# Patient Record
Sex: MALE
[De-identification: names, ages, dates, MRNs, and addresses within clinical notes are randomized per-mention and may not be internally consistent; named-entity substitution may affect disease eponyms.]

---

## 2018-03-02 ENCOUNTER — CHARTING TRANS (OUTPATIENT)
Dept: OTHER | Age: 23
End: 2018-03-02

## 2018-03-02 ENCOUNTER — IMAGING SERVICES (OUTPATIENT)
Dept: OTHER | Age: 23
End: 2018-03-02

## 2018-11-01 VITALS
HEART RATE: 101 BPM | TEMPERATURE: 98.4 F | OXYGEN SATURATION: 97 % | SYSTOLIC BLOOD PRESSURE: 110 MMHG | RESPIRATION RATE: 15 BRPM | DIASTOLIC BLOOD PRESSURE: 68 MMHG

## 2020-08-21 LAB — AMB EXT COVID-19 RESULT: NOT DETECTED

## 2020-08-28 ENCOUNTER — HOSPITAL ENCOUNTER (EMERGENCY)
Facility: HOSPITAL | Age: 25
Discharge: HOME | End: 2020-08-28
Attending: EMERGENCY MEDICINE
Payer: OTHER MISCELLANEOUS

## 2020-08-28 VITALS
OXYGEN SATURATION: 95 % | SYSTOLIC BLOOD PRESSURE: 132 MMHG | HEIGHT: 68 IN | TEMPERATURE: 98 F | RESPIRATION RATE: 22 BRPM | DIASTOLIC BLOOD PRESSURE: 79 MMHG | BODY MASS INDEX: 43.19 KG/M2 | WEIGHT: 285 LBS | HEART RATE: 82 BPM

## 2020-08-28 DIAGNOSIS — M54.6 BACK PAIN OF THORACOLUMBAR REGION: Primary | ICD-10-CM

## 2020-08-28 DIAGNOSIS — M54.50 BACK PAIN OF THORACOLUMBAR REGION: Primary | ICD-10-CM

## 2020-08-28 PROCEDURE — 99284 EMERGENCY DEPT VISIT MOD MDM: CPT

## 2020-08-28 PROCEDURE — 96375 TX/PRO/DX INJ NEW DRUG ADDON: CPT

## 2020-08-28 PROCEDURE — 96374 THER/PROPH/DIAG INJ IV PUSH: CPT

## 2020-08-28 RX ORDER — METHYLPREDNISOLONE 4 MG/1
TABLET ORAL
Qty: 1 PACKAGE | Refills: 0 | Status: SHIPPED | OUTPATIENT
Start: 2020-08-28

## 2020-08-28 RX ORDER — DEXAMETHASONE SODIUM PHOSPHATE 4 MG/ML
10 VIAL (ML) INJECTION ONCE
Status: COMPLETED | OUTPATIENT
Start: 2020-08-28 | End: 2020-08-28

## 2020-08-28 RX ORDER — DIAZEPAM 5 MG/ML
5 INJECTION, SOLUTION INTRAMUSCULAR; INTRAVENOUS ONCE
Status: COMPLETED | OUTPATIENT
Start: 2020-08-28 | End: 2020-08-28

## 2020-08-28 RX ORDER — MELOXICAM 15 MG/1
15 TABLET ORAL DAILY
COMMUNITY

## 2020-08-28 RX ORDER — HYDROMORPHONE HYDROCHLORIDE 1 MG/ML
0.5 INJECTION, SOLUTION INTRAMUSCULAR; INTRAVENOUS; SUBCUTANEOUS EVERY 30 MIN PRN
Status: DISCONTINUED | OUTPATIENT
Start: 2020-08-28 | End: 2020-08-28

## 2020-08-28 RX ORDER — NABUMETONE 500 MG/1
500 TABLET, FILM COATED ORAL 2 TIMES DAILY
COMMUNITY

## 2020-08-28 RX ORDER — CYCLOBENZAPRINE HCL 10 MG
10 TABLET ORAL 3 TIMES DAILY PRN
Qty: 20 TABLET | Refills: 0 | Status: SHIPPED | OUTPATIENT
Start: 2020-08-28 | End: 2020-09-04

## 2020-08-28 RX ORDER — KETOROLAC TROMETHAMINE 30 MG/ML
30 INJECTION, SOLUTION INTRAMUSCULAR; INTRAVENOUS ONCE
Status: COMPLETED | OUTPATIENT
Start: 2020-08-28 | End: 2020-08-28

## 2020-08-28 RX ORDER — HYDROCODONE BITARTRATE AND ACETAMINOPHEN 5; 325 MG/1; MG/1
1-2 TABLET ORAL EVERY 6 HOURS PRN
Qty: 10 TABLET | Refills: 0 | Status: SHIPPED | OUTPATIENT
Start: 2020-08-28 | End: 2020-09-04

## 2020-08-28 NOTE — ED INITIAL ASSESSMENT (HPI)
Herniated disk in march after work injury. Right lower Back pain today while getting up from sitting on toilet. 100mcg fentanyl given in ambulance.

## 2020-08-28 NOTE — ED PROVIDER NOTES
Patient Seen in: [facility] Emergency Department      History   Patient presents with:  Back Pain    Stated Complaint: back pain    HPI    49-year-old male with known history of herniated disc from L3-S1 related to a work injury in March of this yea auscultation bilaterally. Heart: Regular rate and rhythm. Abdomen: Soft, nontender. No CVA tenderness. No mass. Skin: No rash. No edema. Neurologic: No focal neurologic deficits.   Normal speech pattern  Musculoskeletal: Right SI tenderness to palpat spasms. Qty: 20 tablet Refills: 0    HYDROcodone-acetaminophen 5-325 MG Oral Tab  Take 1-2 tablets by mouth every 6 (six) hours as needed for Pain.   Qty: 10 tablet Refills: 0

## 2020-09-10 ENCOUNTER — HOSPITAL ENCOUNTER (EMERGENCY)
Facility: HOSPITAL | Age: 25
Discharge: HOME | End: 2020-09-10
Attending: EMERGENCY MEDICINE
Payer: OTHER MISCELLANEOUS

## 2020-09-10 ENCOUNTER — APPOINTMENT (OUTPATIENT)
Dept: MRI IMAGING | Facility: HOSPITAL | Age: 25
End: 2020-09-10
Attending: EMERGENCY MEDICINE
Payer: OTHER MISCELLANEOUS

## 2020-09-10 ENCOUNTER — APPOINTMENT (OUTPATIENT)
Dept: GENERAL RADIOLOGY | Facility: HOSPITAL | Age: 25
End: 2020-09-10
Attending: EMERGENCY MEDICINE
Payer: OTHER MISCELLANEOUS

## 2020-09-10 VITALS
WEIGHT: 285.06 LBS | OXYGEN SATURATION: 98 % | HEART RATE: 84 BPM | TEMPERATURE: 98 F | SYSTOLIC BLOOD PRESSURE: 137 MMHG | HEIGHT: 68 IN | RESPIRATION RATE: 18 BRPM | DIASTOLIC BLOOD PRESSURE: 77 MMHG | BODY MASS INDEX: 43.2 KG/M2

## 2020-09-10 DIAGNOSIS — M54.50 BACK PAIN, LUMBOSACRAL: Primary | ICD-10-CM

## 2020-09-10 PROCEDURE — 96375 TX/PRO/DX INJ NEW DRUG ADDON: CPT

## 2020-09-10 PROCEDURE — 99284 EMERGENCY DEPT VISIT MOD MDM: CPT

## 2020-09-10 PROCEDURE — 72110 X-RAY EXAM L-2 SPINE 4/>VWS: CPT | Performed by: EMERGENCY MEDICINE

## 2020-09-10 PROCEDURE — 96374 THER/PROPH/DIAG INJ IV PUSH: CPT

## 2020-09-10 PROCEDURE — 96376 TX/PRO/DX INJ SAME DRUG ADON: CPT

## 2020-09-10 PROCEDURE — 72148 MRI LUMBAR SPINE W/O DYE: CPT | Performed by: EMERGENCY MEDICINE

## 2020-09-10 RX ORDER — HYDROCODONE BITARTRATE AND ACETAMINOPHEN 5; 325 MG/1; MG/1
1-2 TABLET ORAL EVERY 6 HOURS PRN
Qty: 10 TABLET | Refills: 0 | Status: SHIPPED | OUTPATIENT
Start: 2020-09-10 | End: 2020-09-17

## 2020-09-10 RX ORDER — METHYLPREDNISOLONE SODIUM SUCCINATE 125 MG/2ML
125 INJECTION, POWDER, LYOPHILIZED, FOR SOLUTION INTRAMUSCULAR; INTRAVENOUS ONCE
Status: COMPLETED | OUTPATIENT
Start: 2020-09-10 | End: 2020-09-10

## 2020-09-10 RX ORDER — METHYLPREDNISOLONE 4 MG/1
TABLET ORAL
Qty: 1 PACKAGE | Refills: 0 | Status: SHIPPED | OUTPATIENT
Start: 2020-09-10

## 2020-09-10 RX ORDER — HYDROMORPHONE HYDROCHLORIDE 1 MG/ML
0.5 INJECTION, SOLUTION INTRAMUSCULAR; INTRAVENOUS; SUBCUTANEOUS ONCE
Status: COMPLETED | OUTPATIENT
Start: 2020-09-10 | End: 2020-09-10

## 2020-09-10 RX ORDER — KETOROLAC TROMETHAMINE 30 MG/ML
30 INJECTION, SOLUTION INTRAMUSCULAR; INTRAVENOUS ONCE
Status: COMPLETED | OUTPATIENT
Start: 2020-09-10 | End: 2020-09-10

## 2020-09-10 NOTE — ED INITIAL ASSESSMENT (HPI)
Pt states was seen here 2 weeks ago for same severe lumbar back pain,saw chiropractor and states got worse after that

## 2020-09-11 NOTE — ED PROVIDER NOTES
Patient Seen in: [facility] Emergency Department      History   Patient presents with:  Back Pain    Stated Complaint: back pain    HPI    Patient is a 28-year-old male presents emergency room with a history of ongoing low back pain is been present 1846 98 %   O2 Device 09/10/20 1945 None (Room air)       Current:/77   Pulse 84   Temp 97.8 °F (36.6 °C)   Resp 18   Ht 172.7 cm (5' 8\")   Wt 129.3 kg   SpO2 98%   BMI 43.34 kg/m²         Physical Exam  GENERAL: Well-developed, well-nourished femal TECHNIQUE:  AP, lateral, oblique, and coned down L5-S1 views were obtained. COMPARISON:  None. INDICATIONS:  back pain  PATIENT STATED HISTORY: (As transcribed by Technologist)  Patient is having low back pain; worse on right but has pain bilaterally.  Pa no evidence of a neurologic compromise in either lower extremity and no history of any bowel or bladder incontinence or retention. Will discharge home at this time. Patient x-ray and MRI findings as noted above.   The patient was given IV Toradol, IV So MG Oral Tab  Take 1-2 tablets by mouth every 6 (six) hours as needed for Pain. Qty: 10 tablet Refills: 0    !! - Potential duplicate medications found. Please discuss with provider.

## 2024-01-11 ENCOUNTER — HOSPITAL ENCOUNTER (OUTPATIENT)
Facility: HOSPITAL | Age: 29
Setting detail: OBSERVATION
Discharge: HOME | End: 2024-01-12
Attending: EMERGENCY MEDICINE | Admitting: HOSPITALIST
Payer: COMMERCIAL

## 2024-01-11 ENCOUNTER — APPOINTMENT (OUTPATIENT)
Dept: CT IMAGING | Facility: HOSPITAL | Age: 29
End: 2024-01-11
Attending: EMERGENCY MEDICINE
Payer: COMMERCIAL

## 2024-01-11 ENCOUNTER — APPOINTMENT (OUTPATIENT)
Dept: GENERAL RADIOLOGY | Facility: HOSPITAL | Age: 29
End: 2024-01-11
Payer: COMMERCIAL

## 2024-01-11 ENCOUNTER — APPOINTMENT (OUTPATIENT)
Dept: CV DIAGNOSTICS | Facility: HOSPITAL | Age: 29
End: 2024-01-11
Attending: INTERNAL MEDICINE
Payer: COMMERCIAL

## 2024-01-11 DIAGNOSIS — R07.9 ACUTE CHEST PAIN: Primary | ICD-10-CM

## 2024-01-11 LAB
ALBUMIN SERPL-MCNC: 4.4 G/DL (ref 3.4–5)
ALBUMIN/GLOB SERPL: 1 {RATIO} (ref 1–2)
ALP LIVER SERPL-CCNC: 136 U/L
ALT SERPL-CCNC: 101 U/L
ANION GAP SERPL CALC-SCNC: 3 MMOL/L (ref 0–18)
AST SERPL-CCNC: 43 U/L (ref 15–37)
ATRIAL RATE: 66 BPM
ATRIAL RATE: 69 BPM
BASOPHILS # BLD AUTO: 0.06 X10(3) UL (ref 0–0.2)
BASOPHILS NFR BLD AUTO: 0.6 %
BILIRUB SERPL-MCNC: 0.4 MG/DL (ref 0.1–2)
BUN BLD-MCNC: 15 MG/DL (ref 9–23)
CALCIUM BLD-MCNC: 9 MG/DL (ref 8.5–10.1)
CHLORIDE SERPL-SCNC: 106 MMOL/L (ref 98–112)
CHOLEST SERPL-MCNC: 156 MG/DL (ref ?–200)
CO2 SERPL-SCNC: 29 MMOL/L (ref 21–32)
CREAT BLD-MCNC: 0.71 MG/DL
D DIMER PPP FEU-MCNC: <0.27 UG/ML FEU (ref ?–0.5)
EGFRCR SERPLBLD CKD-EPI 2021: 127 ML/MIN/1.73M2 (ref 60–?)
EOSINOPHIL # BLD AUTO: 0.21 X10(3) UL (ref 0–0.7)
EOSINOPHIL NFR BLD AUTO: 2 %
ERYTHROCYTE [DISTWIDTH] IN BLOOD BY AUTOMATED COUNT: 13.2 %
GLOBULIN PLAS-MCNC: 4.2 G/DL (ref 2.8–4.4)
GLUCOSE BLD-MCNC: 107 MG/DL (ref 70–99)
HCT VFR BLD AUTO: 45.7 %
HDLC SERPL-MCNC: 37 MG/DL (ref 40–59)
HGB BLD-MCNC: 15.8 G/DL
IMM GRANULOCYTES # BLD AUTO: 0.05 X10(3) UL (ref 0–1)
IMM GRANULOCYTES NFR BLD: 0.5 %
LDLC SERPL CALC-MCNC: 91 MG/DL (ref ?–100)
LYMPHOCYTES # BLD AUTO: 4.31 X10(3) UL (ref 1–4)
LYMPHOCYTES NFR BLD AUTO: 41 %
MCH RBC QN AUTO: 29.9 PG (ref 26–34)
MCHC RBC AUTO-ENTMCNC: 34.6 G/DL (ref 31–37)
MCV RBC AUTO: 86.4 FL
MONOCYTES # BLD AUTO: 0.64 X10(3) UL (ref 0.1–1)
MONOCYTES NFR BLD AUTO: 6.1 %
NEUTROPHILS # BLD AUTO: 5.23 X10 (3) UL (ref 1.5–7.7)
NEUTROPHILS # BLD AUTO: 5.23 X10(3) UL (ref 1.5–7.7)
NEUTROPHILS NFR BLD AUTO: 49.8 %
NONHDLC SERPL-MCNC: 119 MG/DL (ref ?–130)
OSMOLALITY SERPL CALC.SUM OF ELEC: 287 MOSM/KG (ref 275–295)
P AXIS: 2 DEGREES
P AXIS: 3 DEGREES
P-R INTERVAL: 164 MS
P-R INTERVAL: 172 MS
PLATELET # BLD AUTO: 305 10(3)UL (ref 150–450)
POTASSIUM SERPL-SCNC: 3.7 MMOL/L (ref 3.5–5.1)
PROT SERPL-MCNC: 8.6 G/DL (ref 6.4–8.2)
Q-T INTERVAL: 404 MS
Q-T INTERVAL: 418 MS
QRS DURATION: 94 MS
QRS DURATION: 96 MS
QTC CALCULATION (BEZET): 432 MS
QTC CALCULATION (BEZET): 438 MS
R AXIS: 0 DEGREES
R AXIS: 7 DEGREES
RBC # BLD AUTO: 5.29 X10(6)UL
SODIUM SERPL-SCNC: 138 MMOL/L (ref 136–145)
T AXIS: 16 DEGREES
T AXIS: 24 DEGREES
TRIGL SERPL-MCNC: 159 MG/DL (ref 30–149)
TROPONIN I SERPL HS-MCNC: 195 NG/L
TROPONIN I SERPL HS-MCNC: 212 NG/L
VENTRICULAR RATE: 66 BPM
VENTRICULAR RATE: 69 BPM
VLDLC SERPL CALC-MCNC: 26 MG/DL (ref 0–30)
WBC # BLD AUTO: 10.5 X10(3) UL (ref 4–11)

## 2024-01-11 PROCEDURE — 80061 LIPID PANEL: CPT | Performed by: EMERGENCY MEDICINE

## 2024-01-11 PROCEDURE — 93005 ELECTROCARDIOGRAM TRACING: CPT

## 2024-01-11 PROCEDURE — 99285 EMERGENCY DEPT VISIT HI MDM: CPT

## 2024-01-11 PROCEDURE — 71275 CT ANGIOGRAPHY CHEST: CPT | Performed by: EMERGENCY MEDICINE

## 2024-01-11 PROCEDURE — 85025 COMPLETE CBC W/AUTO DIFF WBC: CPT | Performed by: EMERGENCY MEDICINE

## 2024-01-11 PROCEDURE — 93306 TTE W/DOPPLER COMPLETE: CPT | Performed by: INTERNAL MEDICINE

## 2024-01-11 PROCEDURE — 96374 THER/PROPH/DIAG INJ IV PUSH: CPT

## 2024-01-11 PROCEDURE — 80053 COMPREHEN METABOLIC PANEL: CPT | Performed by: EMERGENCY MEDICINE

## 2024-01-11 PROCEDURE — 84484 ASSAY OF TROPONIN QUANT: CPT | Performed by: EMERGENCY MEDICINE

## 2024-01-11 PROCEDURE — 96372 THER/PROPH/DIAG INJ SC/IM: CPT

## 2024-01-11 PROCEDURE — 85379 FIBRIN DEGRADATION QUANT: CPT | Performed by: EMERGENCY MEDICINE

## 2024-01-11 PROCEDURE — 71045 X-RAY EXAM CHEST 1 VIEW: CPT

## 2024-01-11 PROCEDURE — 80053 COMPREHEN METABOLIC PANEL: CPT

## 2024-01-11 PROCEDURE — 85025 COMPLETE CBC W/AUTO DIFF WBC: CPT

## 2024-01-11 PROCEDURE — 93010 ELECTROCARDIOGRAM REPORT: CPT

## 2024-01-11 PROCEDURE — 70450 CT HEAD/BRAIN W/O DYE: CPT | Performed by: EMERGENCY MEDICINE

## 2024-01-11 PROCEDURE — 84484 ASSAY OF TROPONIN QUANT: CPT

## 2024-01-11 RX ORDER — PROCHLORPERAZINE EDISYLATE 5 MG/ML
5 INJECTION INTRAMUSCULAR; INTRAVENOUS EVERY 8 HOURS PRN
Status: DISCONTINUED | OUTPATIENT
Start: 2024-01-11 | End: 2024-01-12

## 2024-01-11 RX ORDER — ONDANSETRON 2 MG/ML
4 INJECTION INTRAMUSCULAR; INTRAVENOUS EVERY 6 HOURS PRN
Status: DISCONTINUED | OUTPATIENT
Start: 2024-01-11 | End: 2024-01-12

## 2024-01-11 RX ORDER — MIRTAZAPINE 15 MG/1
15 TABLET, ORALLY DISINTEGRATING ORAL NIGHTLY PRN
Status: DISCONTINUED | OUTPATIENT
Start: 2024-01-11 | End: 2024-01-12

## 2024-01-11 RX ORDER — GABAPENTIN 300 MG/1
300 CAPSULE ORAL 2 TIMES DAILY
Status: DISCONTINUED | OUTPATIENT
Start: 2024-01-11 | End: 2024-01-12

## 2024-01-11 RX ORDER — GABAPENTIN 300 MG/1
300 CAPSULE ORAL 2 TIMES DAILY
COMMUNITY
Start: 2023-10-25

## 2024-01-11 RX ORDER — ACETAMINOPHEN 500 MG
500 TABLET ORAL EVERY 4 HOURS PRN
Status: DISCONTINUED | OUTPATIENT
Start: 2024-01-11 | End: 2024-01-12

## 2024-01-11 RX ORDER — MIRTAZAPINE 15 MG/1
15 TABLET, FILM COATED ORAL
COMMUNITY

## 2024-01-11 RX ORDER — LISINOPRIL 30 MG/1
30 TABLET ORAL DAILY
Status: ON HOLD | COMMUNITY
Start: 2023-06-30 | End: 2024-01-11

## 2024-01-11 RX ORDER — METOPROLOL TARTRATE 50 MG/1
50 TABLET, FILM COATED ORAL
Status: DISCONTINUED | OUTPATIENT
Start: 2024-01-11 | End: 2024-01-12

## 2024-01-11 RX ORDER — MORPHINE SULFATE 4 MG/ML
4 INJECTION, SOLUTION INTRAMUSCULAR; INTRAVENOUS ONCE
Status: COMPLETED | OUTPATIENT
Start: 2024-01-11 | End: 2024-01-11

## 2024-01-11 RX ORDER — HEPARIN SODIUM 5000 [USP'U]/ML
7500 INJECTION, SOLUTION INTRAVENOUS; SUBCUTANEOUS EVERY 8 HOURS SCHEDULED
Status: DISCONTINUED | OUTPATIENT
Start: 2024-01-11 | End: 2024-01-12

## 2024-01-11 RX ORDER — ASPIRIN 81 MG/1
324 TABLET, CHEWABLE ORAL ONCE
Status: COMPLETED | OUTPATIENT
Start: 2024-01-11 | End: 2024-01-11

## 2024-01-11 RX ORDER — DIVALPROEX SODIUM 500 MG/1
500 TABLET, EXTENDED RELEASE ORAL DAILY
Status: DISCONTINUED | OUTPATIENT
Start: 2024-01-11 | End: 2024-01-12

## 2024-01-11 RX ORDER — DIVALPROEX SODIUM 500 MG/1
1 TABLET, EXTENDED RELEASE ORAL DAILY
COMMUNITY
Start: 2022-11-05

## 2024-01-11 NOTE — ED QUICK NOTES
Orders for admission, patient is aware of plan and ready to go upstairs. Any questions, please call ED RN Zulay at extension [phone].     Patient Covid vaccination status: Fully vaccinated     COVID Test Ordered in ED: None    COVID Suspicion at Admission: N/A    Running Infusions:  None    Mental Status/LOC at time of transport: Alert, oriented X4.    Other pertinent information: Radiologist said to follow the protocol to \"keep HR low\" for CT Coronary tomorrow.  CIWA score: N/A   NIH score:  N/A

## 2024-01-11 NOTE — ED QUICK NOTES
Patient states he was at work, jumped to grab a cord and when he came down he lost his balance. A coworker helped balance him. He did not fall. He states he \"briefly lost feeling in his legs.\" Was able to go back to work, but has felt chest wall pain from \"ciwbch-pj-cceayg like a burning, stabbing sensation\" that is painful with palpation. Patient denies any shortness of breath. No pain with deep breathing. Patient states he \"feels like it's something spine related\" because he has a history of back problems.

## 2024-01-11 NOTE — H&P
[facility]    History and Physical     Peewee Myers Patient Status:  Emergency    10/22/1995 MRN [id number]   Location [facility] EMERGENCY DEPARTMENT Attending Yumiko Servin DO   Hosp Day # 0 PCP Wilver Martinez MD     Chief Complaint: Chest pain    History of Present Illness: Peewee Myers is a 28 year old male with history of anxiety, depression, hypertension, obesity presenting with chest pain.  Patient says he started having chest pain this afternoon.  Localized to the mid chest.  No associated symptoms.  Patient does mention recent trauma to the chest with equipment following on him 2 days prior.  Patient denies any pain consistent with the pain he is having now at that time.  Patient does also mention having a headache for the last 2 weeks.  He also has associated nausea with this.  Patient mentions some intermittent blurry vision.  Patient currently presented to emergency room due to continued chest discomfort.  Patient does mention having cocaine earlier in the day.  Patient also did cocaine yesterday.  Patient never had symptoms of chest pain after episodes of cocaine use.    Past Medical History:  Past Medical History:   Diagnosis Date    Anxiety     Back pain     Cervical spondylosis     Depression     Essential hypertension     Lactose intolerance     Obesity     Suicidal thoughts        Past Surgical History: History reviewed. No pertinent surgical history.    Social History:  reports that he has quit smoking. He has never used smokeless tobacco. He reports that he does not currently use alcohol. He reports that he does not use drugs.1 ppd tobacco, no alcohol use 5 days- 4/5 beers a day normally, used cocaine this am, marijuana, , 3 children, working, no cane or walker     Family History: No family history on file.  Mother living  Father living  2 sisters living  0 brothers    Allergies:     Allergies   Allergen Reactions    Lactose OTHER (SEE COMMENTS)     gas        Medications:    No current facility-administered medications on file prior to encounter.     Current Outpatient Medications on File Prior to Encounter   Medication Sig Dispense Refill    Brexpiprazole 1 MG Oral Tab Take by mouth daily.      divalproex  MG Oral Tablet 24 Hr Take 1 tablet (500 mg total) by mouth Q12H.      gabapentin 300 MG Oral Cap       lisinopril 30 MG Oral Tab Take 1 tablet (30 mg total) by mouth daily.      methylPREDNISolone (MEDROL) 4 MG Oral Tablet Therapy Pack Dosepack: take as directed 1 Package 0    Meloxicam 15 MG Oral Tab Take 15 mg by mouth daily.      Nabumetone 500 MG Oral Tab Take 500 mg by mouth 2 (two) times daily.      methylPREDNISolone (MEDROL) 4 MG Oral Tablet Therapy Pack Dosepack: take as directed 1 Package 0       Review of Systems:   A comprehensive 14 point review of systems was completed.    Pertinent positives and negatives noted in the HPI.    Physical Exam:    BP (!) 145/99   Pulse 63   Temp 96.9 °F (36.1 °C) (Temporal)   Resp 21   Ht 5' 7\" (1.702 m)   Wt (!) 315 lb (142.9 kg)   SpO2 96%   BMI 49.34 kg/m²   General: No acute distress. Alert and oriented x 3.  HEENT: Normocephalic atraumatic. Moist mucous membranes. EOM-I.  Neck: No JVD. No carotid bruits.  Respiratory: Clear to auscultation bilaterally. No wheezes. No crackles  Cardiovascular: S1, S2. Regular rate and rhythm. No murmurs  Chest and Back: No tenderness or deformity.  Abdomen: Soft, nontender, nondistended.  Positive bowel sounds. No rebound, guarding  Neurologic: No focal neurological deficits. CNII-XII grossly intact. Sensation and strength intact  Musculoskeletal: Moves all extremities.  Extremities: No edema or tenderness of the LE  Integument: No new rashes or lesions.   Psychiatric: Appropriate mood and affect.      Diagnostic Data:      Labs:  Recent Labs   Lab 01/11/24  1303   WBC 10.5   HGB 15.8   MCV 86.4   .0       Recent Labs   Lab 01/11/24  1303   *   BUN 15    CREATSERUM 0.71   CA 9.0   ALB 4.4      K 3.7      CO2 29.0   ALKPHO 136*   AST 43*   *   BILT 0.4   TP 8.6*       Estimated Creatinine Clearance: 142.2 mL/min (based on SCr of 0.71 mg/dL).    No results for input(s): \"PTP\", \"INR\" in the last 168 hours.    No results for input(s): \"TROP\", \"CK\" in the last 168 hours.    Imaging: Imaging data reviewed in Epic.      ASSESSMENT / PLAN:   28 year old male with history of anxiety, depression, hypertension, obesity presenting with chest pain.    Chest Pain  -etiology uncertain  -pt with cocaine use, possible cause of sx  -trop elevated  -cardiology consulted  -CTA chest pending  -echo pending  -coronary CTA tomorrow pending above evaluation  -monitor on tele    Headache  Blurry Vision  -etiology uncertain  -possibly secondary to drug use  -echo pending to evaluate for possible vegetations  -ct brain neg for acute pathology   -monitor clinically, if recurrent or worsening sx consider Neurology inpatient evaluation    Elevated LFT  -likely secondary to fatty liver  -recheck as outpt and if remains elevated outpt GI follow up    HTN  -sbp stable  -lisinopril    Anxiety  Depression  -Depakote  -gabapentin     Quality:  DVT Prophylaxis: subcutaneous heparin  CODE status:   Kiara: none    Plan of care discussed with patient and staff    Dispo: no discharge.     Sonu Patel MD  [facility]  [phone]

## 2024-01-11 NOTE — ED PROVIDER NOTES
Patient Seen in: [facility] Emergency Department      History     Chief Complaint   Patient presents with    Pain     Stated Complaint:     Subjective:   HPI    This is a 28-year-old male that has a past medical history of depression, obesity, hypertension, cervical spondylolysis on gabapentin who presents with acute onset of chest pain that began 1 hour prior to arrival radiating to the mid back with shortness of breath.  Patient states he was at work and he jumped up to grab a cable that was above his head as he was jumping up he felt a severe pain in his chest.  He states it radiated across his chest and went to his mid back.  When he landed on the ground his legs felt numb for 2 minutes.  He felt short of breath.  They were able to walk onto the chair.  He was not diaphoretic.  No nausea or vomiting.  He also reports that he is been off his blood pressure medication for over 3 weeks.  It ran out he never got it refilled.  He smokes a pack of tobacco daily.  He uses marijuana Gummies.  No other drugs.  Currently rates his pain as 8/10.  Presents here for further evaluation    Objective:   Past Medical History:   Diagnosis Date    Anxiety     Back pain     Cervical spondylosis     Depression     Essential hypertension     Lactose intolerance     Obesity     Suicidal thoughts               History reviewed. No pertinent surgical history.             Social History     Socioeconomic History    Marital status:    Tobacco Use    Smoking status: Former    Smokeless tobacco: Never   Vaping Use    Vaping Use: Former   Substance and Sexual Activity    Alcohol use: Not Currently    Drug use: Never              Review of Systems    Positive for stated complaint:   Other systems are as noted in HPI.  Constitutional and vital signs reviewed.      All other systems reviewed and negative except as noted above.    Physical Exam     ED Triage Vitals   BP 01/11/24 1306 150/88   Pulse 01/11/24 1306 84   Resp 01/11/24  1306 20   Temp 01/11/24 1306 96.9 °F (36.1 °C)   Temp src 01/11/24 1306 Temporal   SpO2 01/11/24 1306 96 %   O2 Device 01/11/24 1330 None (Room air)       Current:BP (!) 145/99   Pulse 63   Temp 96.9 °F (36.1 °C) (Temporal)   Resp 21   Ht 170.2 cm (5' 7\")   Wt (!) 142.9 kg   SpO2 96%   BMI 49.34 kg/m²         Physical Exam    GENERAL: Awake, alert oriented x3, nontoxic appearing.   SKIN: Normal, warm, and dry.  HEENT:  Pupils equally round and reactive to light. Conjuctiva clear.  Oropharynx is clear and moist.   Lungs: Clear to auscultation bilaterally with no rales, no retractions, and no wheezing.  HEART:  Regular rate and rhythm. S1 and S2. No murmurs, no rubs or gallops.   ABDOMEN: Soft, nontender and nondistended. Normoactive bowel sounds. No rebound. No guarding.   EXTREMITIES: Warm with brisk capillary refill.       ED Course     Labs Reviewed   COMP METABOLIC PANEL (14) - Abnormal; Notable for the following components:       Result Value    Glucose 107 (*)     AST 43 (*)      (*)     Alkaline Phosphatase 136 (*)     Total Protein 8.6 (*)     All other components within normal limits   TROPONIN I HIGH SENSITIVITY - Abnormal; Notable for the following components:    Troponin I (High Sensitivity) 212 (*)     All other components within normal limits   LIPID PANEL - Abnormal; Notable for the following components:    HDL Cholesterol 37 (*)     Triglycerides 159 (*)     All other components within normal limits   CBC W/ DIFFERENTIAL - Abnormal; Notable for the following components:    Lymphocyte Absolute 4.31 (*)     All other components within normal limits   D-DIMER - Normal   CBC WITH DIFFERENTIAL WITH PLATELET    Narrative:     The following orders were created for panel order CBC With Differential With Platelet.  Procedure                               Abnormality         Status                     ---------                               -----------         ------                     CBC W/  DIFFERENTIAL[[id number]]          Abnormal            Final result                 Please view results for these tests on the individual orders.   TROPONIN I HIGH SENSITIVITY   RAINBOW DRAW BLUE     EKG    Rate, intervals and axes as noted on EKG Report.  Rate: 69  Rhythm: Sinus Rhythm  Reading: Acute changes               XR CHEST AP PORTABLE  (CPT=71045)    Result Date: 1/11/2024  PROCEDURE:  XR CHEST AP PORTABLE  (CPT=71045)  TECHNIQUE:  AP chest radiograph was obtained.  COMPARISON:  None.  INDICATIONS:  chest pain  PATIENT STATED HISTORY: (As transcribed by Technologist)  Patient states he has chest pain and leg numbness starting today after jumping to reach a cord.    FINDINGS:  No focal consolidation, pleural effusion, or pneumothorax.            CONCLUSION:  No focal consolidation.   LOCATION:  [facility]      Dictated by (CST): Ewa Christy MD on 1/11/2024 at 1:47 PM     Finalized by (CST): Ewa Christy MD on 1/11/2024 at 1:47 PM                MDM      This is a 28-year-old male that has a past medical history of depression, obesity, hypertension, cervical spondylolysis on gabapentin who presents with acute onset of chest pain that began 1 hour prior to arrival radiating to the mid back with shortness of breath.  [place] includes acute coronary syndrome, dissection, musculoskeletal chest wall pain.    Patient placed on cardiac monitor, continuous pulse oximetry and IV line was established of normal saline.  Patient was given 4 baby aspirin.  La CBC: White blood cell count 10.5.  Hemoglobin 15.8.  Platelet 305.  CMP: BUN 15.  Count 0.7.  Glucose 107.  Bicarb 29.  Troponin was elevated at 212.      Evaluated the chest x-ray showed no obvious abnormality could consolidation or effusions.  I also reviewed the radiology interpretation as above.        CTA chest obtained due to elevated troponin, chest pain and radiation of pain to back to evaluate for possible dissection.  CTA chest was obtained and  demonstrated      3 PM patient provides additional information that he was working underneath a car 2 weeks ago and the adonis broke and he did fall on his chest.  He was able to get out from underneath the vehicle by himself.  He had some chest discomfort after that happened but never sought medical attention.  He also reports to me that he has had headaches for 2 weeks and has never had this before.      Given this information and we will add a CT scan brain to the workup.  And also evaluate for possible traumatic chest injury on the CAT scan.      Patient will be admitted due to elevated troponin.  Dr. Alex Duran came by from [facility] cardiology to evaluate patient.  Plan to do CT coronary arteries tomorrow.      Discussed with [facility] hospitalist Dr. Patel.    Patient is aware of plan.  Expresses understanding.    Dr. Salazar will check ct imagaing      Disposition and Plan     Clinical Impression:  1. Acute chest pain         Disposition:  Admit  1/11/2024  4:31 pm    Follow-up:  No follow-up provider specified.        Medications Prescribed:  Current Discharge Medication List                            Hospital Problems       Present on Admission             ICD-10-CM Noted POA    * (Principal) Acute chest pain R07.9 1/11/2024 Unknown

## 2024-01-11 NOTE — CONSULTS
[facility] Cardiology  Consultation Note      Peewee Myers Patient Status:  Emergency    10/22/1995 MRN [id number]   Location [facility] EMERGENCY DEPARTMENT Attending Yumiko Servin,    Hosp Day # 0 PCP Wilver Martinez MD     Impression:  1. mild HS troponin elevation, atypical/palpable chest pain  2. acute onset back pain, after awkward jump/landing while at work  3. moderate HTN, likely reactionary  4. obesity    atypical CP, seems more neurogenic/dermatomal after awkward jump/landing.  Mild troponin elevation, non-specific.      Recommendations:  - serial enzymes (hold heparin if level stable)  - TTE  - r/o dissection CTA in ER  - if stable, anticipate coronary CTA tomorrow.  - consider MRI imaging if concerning neurologic symptoms (progressive numbness/weakness, bowel/bladder incontinence, etc).          History of Present Illness:  Peewee Myers is a 28 year old male who presented to [facility] on 2024.    Seen in cardiac consultation for chest pain and mildly elevated HS troponin.  No hx cardiac issues, +obesity, HTN. While at work (), he jumped to grab a cord and landed awkwardly, with immediate back pain, numbness in legs (without weakness/imbalance/fall), as well as mild-moderate \"band like, pins and needles\" pain that wraps around the chest.  ECG SR, HS trop mildly elevated 212.  Worse with touching skin. Former smoker, no FHx of premature MI/CVA. Currently comfortable.    Medications:  No current facility-administered medications for this encounter.       Past Medical History:   Diagnosis Date    Anxiety     Back pain     Cervical spondylosis     Depression     Essential hypertension     Lactose intolerance     Obesity     Suicidal thoughts        History reviewed. No pertinent surgical history.    Family History  family history is not on file.    Social History   reports that he has quit smoking. He has never used smokeless tobacco. He reports that he does  not currently use alcohol. He reports that he does not use drugs.     Allergies  Allergies   Allergen Reactions    Lactose OTHER (SEE COMMENTS)     gas         Review of Systems:  Constitutional: negative for fevers  Eyes: negative for visual disturbance  Ears, nose, mouth, throat, and face: negative for epistaxis  Respiratory: negative for dyspnea on exertion  Cardiovascular: negative for chest pain  Gastrointestinal: negative for melena  Genitourinary:negative for hematuria  Hematologic/lymphatic: negative for bleeding  Musculoskeletal:negative for myalgias  Neurological: negative for dizziness and headaches  Endocrine: negative for temperature intolerance      Physical Exam:  Blood pressure (!) 147/91, pulse 75, temperature 96.9 °F (36.1 °C), temperature source Temporal, resp. rate 20, height 67\", weight (!) 315 lb (142.9 kg), SpO2 97%.  Temp (24hrs), Av.9 °F (36.1 °C), Min:96.9 °F (36.1 °C), Max:96.9 °F (36.1 °C)    Wt Readings from Last 3 Encounters:   24 (!) 315 lb (142.9 kg)   09/10/20 285 lb 0.9 oz (129.3 kg)   20 285 lb (129.3 kg)       General: Awake and alert; in no acute distress  HEENT: Extraocular movements are intact; sclerae are anicteric; scalp is atrauamatic; no thyromegaly  Neck: Supple; no JVD; no carotid bruits  Cardiac: Regular rate and regular rhythm; no murmurs/rubs/gallops are appreciated; PMI is non-displaced; there is no evidence of a sternal heave  Lungs: Clear to auscultation bilaterally; no accessory muscle use is noted  Abdomen: Soft, non-tender; bowel sounds are normoactive; no hepatosplenomegaly  Extremities: No clubbing or cyanosis; moves all 4 extremities normally  Psychiatric: Normal mood and affect; answers questions appropriately  Dermatologic: No rashes; normal skin turgor    Diagnostic testing:    EKG: Normal sinus rhythm    Labs:   No results found for: \"PT\", \"INR\"  Lab Results   Component Value Date    LDL 91 2024    HDL 37 2024    TRIG 159  01/11/2024    VLDL 26 01/11/2024     Lab Results   Component Value Date    WBC 10.5 01/11/2024    HGB 15.8 01/11/2024    HCT 45.7 01/11/2024    .0 01/11/2024    CREATSERUM 0.71 01/11/2024    BUN 15 01/11/2024     01/11/2024    K 3.7 01/11/2024     01/11/2024    CO2 29.0 01/11/2024     01/11/2024    CA 9.0 01/11/2024    ALB 4.4 01/11/2024    ALKPHO 136 01/11/2024    BILT 0.4 01/11/2024    TP 8.6 01/11/2024    AST 43 01/11/2024     01/11/2024    DDIMER <0.27 01/11/2024         Thank you for allowing our practice to participate in the care of your patient. Please do not hesitate to contact me if you have any questions.    Alex Duran MD  1/11/2024  2:55 PM

## 2024-01-11 NOTE — IMAGING NOTE
ED RN notified of new order for CTA gated coronary arteries, per cardiology note scan is for tomorrow.  Advised to relay to floor RN when admitted that patient will need CTA coronary protocol ordered for HR control.   Needs GFR in AM.  Already has 18g AC IV.  Depending on HR and anxiety patient may also need antianxiety one time before scan tomorrow for history of anxiety.  Echo also ordered, CT chest to rule out PE also ordered.   Rad RN to touch base with floor RN tomorrow AM to organize time

## 2024-01-11 NOTE — ED QUICK NOTES
Radiologist called, told to tell inpatient RN to \"follow the protocol to keep his heart rate low\" for CT Coronary tomorrow.

## 2024-01-12 ENCOUNTER — APPOINTMENT (OUTPATIENT)
Dept: CT IMAGING | Facility: HOSPITAL | Age: 29
End: 2024-01-12
Attending: INTERNAL MEDICINE
Payer: COMMERCIAL

## 2024-01-12 VITALS
HEIGHT: 67 IN | DIASTOLIC BLOOD PRESSURE: 69 MMHG | HEART RATE: 78 BPM | TEMPERATURE: 98 F | BODY MASS INDEX: 49.44 KG/M2 | RESPIRATION RATE: 18 BRPM | OXYGEN SATURATION: 97 % | WEIGHT: 315 LBS | SYSTOLIC BLOOD PRESSURE: 114 MMHG

## 2024-01-12 LAB
ANION GAP SERPL CALC-SCNC: 2 MMOL/L (ref 0–18)
BASOPHILS # BLD AUTO: 0.04 X10(3) UL (ref 0–0.2)
BASOPHILS NFR BLD AUTO: 0.6 %
BUN BLD-MCNC: 14 MG/DL (ref 9–23)
CALCIUM BLD-MCNC: 9 MG/DL (ref 8.5–10.1)
CHLORIDE SERPL-SCNC: 106 MMOL/L (ref 98–112)
CO2 SERPL-SCNC: 27 MMOL/L (ref 21–32)
CREAT BLD-MCNC: 0.74 MG/DL
EGFRCR SERPLBLD CKD-EPI 2021: 125 ML/MIN/1.73M2 (ref 60–?)
EOSINOPHIL # BLD AUTO: 0.21 X10(3) UL (ref 0–0.7)
EOSINOPHIL NFR BLD AUTO: 2.9 %
ERYTHROCYTE [DISTWIDTH] IN BLOOD BY AUTOMATED COUNT: 13.3 %
GLUCOSE BLD-MCNC: 97 MG/DL (ref 70–99)
HCT VFR BLD AUTO: 44.5 %
HGB BLD-MCNC: 14.9 G/DL
IMM GRANULOCYTES # BLD AUTO: 0.03 X10(3) UL (ref 0–1)
IMM GRANULOCYTES NFR BLD: 0.4 %
LYMPHOCYTES # BLD AUTO: 2.74 X10(3) UL (ref 1–4)
LYMPHOCYTES NFR BLD AUTO: 38.1 %
MAGNESIUM SERPL-MCNC: 2.2 MG/DL (ref 1.6–2.6)
MCH RBC QN AUTO: 29.4 PG (ref 26–34)
MCHC RBC AUTO-ENTMCNC: 33.5 G/DL (ref 31–37)
MCV RBC AUTO: 87.8 FL
MONOCYTES # BLD AUTO: 0.47 X10(3) UL (ref 0.1–1)
MONOCYTES NFR BLD AUTO: 6.5 %
NEUTROPHILS # BLD AUTO: 3.7 X10 (3) UL (ref 1.5–7.7)
NEUTROPHILS # BLD AUTO: 3.7 X10(3) UL (ref 1.5–7.7)
NEUTROPHILS NFR BLD AUTO: 51.5 %
OSMOLALITY SERPL CALC.SUM OF ELEC: 280 MOSM/KG (ref 275–295)
PLATELET # BLD AUTO: 316 10(3)UL (ref 150–450)
POTASSIUM SERPL-SCNC: 3.9 MMOL/L (ref 3.5–5.1)
RBC # BLD AUTO: 5.07 X10(6)UL
SODIUM SERPL-SCNC: 135 MMOL/L (ref 136–145)
WBC # BLD AUTO: 7.2 X10(3) UL (ref 4–11)

## 2024-01-12 PROCEDURE — 83735 ASSAY OF MAGNESIUM: CPT | Performed by: HOSPITALIST

## 2024-01-12 PROCEDURE — 85025 COMPLETE CBC W/AUTO DIFF WBC: CPT | Performed by: HOSPITALIST

## 2024-01-12 PROCEDURE — 75580 N-INVAS EST C FFR SW ALY CTA: CPT | Performed by: INTERNAL MEDICINE

## 2024-01-12 PROCEDURE — 75574 CT ANGIO HRT W/3D IMAGE: CPT | Performed by: INTERNAL MEDICINE

## 2024-01-12 PROCEDURE — 80048 BASIC METABOLIC PNL TOTAL CA: CPT | Performed by: HOSPITALIST

## 2024-01-12 PROCEDURE — 96372 THER/PROPH/DIAG INJ SC/IM: CPT

## 2024-01-12 RX ORDER — METOPROLOL TARTRATE 50 MG/1
50 TABLET, FILM COATED ORAL ONCE
Status: DISCONTINUED | OUTPATIENT
Start: 2024-01-12 | End: 2024-01-12

## 2024-01-12 RX ORDER — METOPROLOL TARTRATE 50 MG/1
50 TABLET, FILM COATED ORAL ONCE
Status: DISCONTINUED | OUTPATIENT
Start: 2024-01-13 | End: 2024-01-12

## 2024-01-12 RX ORDER — NITROGLYCERIN 0.4 MG/1
0.4 TABLET SUBLINGUAL ONCE
Status: COMPLETED | OUTPATIENT
Start: 2024-01-12 | End: 2024-01-12

## 2024-01-12 RX ORDER — METOPROLOL TARTRATE 100 MG/1
100 TABLET ORAL ONCE AS NEEDED
Status: DISCONTINUED | OUTPATIENT
Start: 2024-01-13 | End: 2024-01-12

## 2024-01-12 RX ORDER — METOPROLOL TARTRATE 50 MG/1
50 TABLET, FILM COATED ORAL ONCE AS NEEDED
Status: DISCONTINUED | OUTPATIENT
Start: 2024-01-13 | End: 2024-01-12

## 2024-01-12 RX ORDER — METOPROLOL TARTRATE 1 MG/ML
5 INJECTION, SOLUTION INTRAVENOUS SEE ADMIN INSTRUCTIONS
Status: DISCONTINUED | OUTPATIENT
Start: 2024-01-12 | End: 2024-01-12 | Stop reason: HOSPADM

## 2024-01-12 RX ORDER — METOPROLOL TARTRATE 100 MG/1
100 TABLET ORAL ONCE AS NEEDED
Status: COMPLETED | OUTPATIENT
Start: 2024-01-12 | End: 2024-01-12

## 2024-01-12 RX ORDER — IOHEXOL 350 MG/ML
100 INJECTION, SOLUTION INTRAVENOUS
Status: COMPLETED | OUTPATIENT
Start: 2024-01-12 | End: 2024-01-12

## 2024-01-12 RX ORDER — METOPROLOL TARTRATE 50 MG/1
50 TABLET, FILM COATED ORAL ONCE AS NEEDED
Status: COMPLETED | OUTPATIENT
Start: 2024-01-12 | End: 2024-01-12

## 2024-01-12 RX ORDER — METOPROLOL TARTRATE 100 MG/1
100 TABLET ORAL ONCE
Status: DISCONTINUED | OUTPATIENT
Start: 2024-01-12 | End: 2024-01-12

## 2024-01-12 RX ORDER — METOPROLOL TARTRATE 100 MG/1
100 TABLET ORAL ONCE
Status: DISCONTINUED | OUTPATIENT
Start: 2024-01-13 | End: 2024-01-12

## 2024-01-12 RX ORDER — DILTIAZEM HYDROCHLORIDE 5 MG/ML
5 INJECTION INTRAVENOUS SEE ADMIN INSTRUCTIONS
Status: DISCONTINUED | OUTPATIENT
Start: 2024-01-12 | End: 2024-01-12 | Stop reason: HOSPADM

## 2024-01-12 RX ORDER — NITROGLYCERIN 0.4 MG/1
TABLET SUBLINGUAL
Status: COMPLETED
Start: 2024-01-12 | End: 2024-01-12

## 2024-01-12 NOTE — IMAGING NOTE
Peewee Myers to CT Rm 4 GE.   Pt denies use of long acting nitrates like, Imdur, Cialis, Levitra and Viagra.   O2 applied via nasal cannula @ 2LPM.  Procedure explained and questions answered. Vital signs monitored and noted in Flowsheet.  GFR = 125    Contrast = 100ml  0.9 NS flush = 61ml  Average HR = 52    Patient tolerated the procedure without complication. Denies any contrast reaction. Discontinued IV saline lock.   Transported pt back to Rm 2604 accompanied by Transport.

## 2024-01-12 NOTE — PROGRESS NOTES
[facility] Cardiology  Progress Note    Peewee Myers Patient Status:  Observation    10/22/1995 MRN [id number]   Location [facility] 2NE-A Attending Sonu Patel MD   Hosp Day # 0 PCP Wilver Martinez MD       Impression:  1. mild HS troponin elevation, atypical/palpable chest pain  - CTA neg for dissection/PE  - TTE: LVEF 60-65%  2. acute onset back pain, after awkward jump/landing while at work  3. moderate HTN, likely reactionary  4. obesity     atypical CP, seems more neurogenic/dermatomal after awkward jump/landing.  Mild troponin elevation, non-specific.       Recommendations:  - coronary CTA today.  - home if unremarkable.      Subjective:  The patient denies any chest pain or dyspnea at this time.    Objective:  /76 (BP Location: Left arm)   Pulse 55   Temp 98.2 °F (36.8 °C) (Oral)   Resp 19   Ht 67\"   Wt (!) 316 lb 2.2 oz (143.4 kg)   SpO2 97%   BMI 49.51 kg/m²   Temp (24hrs), Av.1 °F (36.7 °C), Min:96.9 °F (36.1 °C), Max:98.5 °F (36.9 °C)    No intake or output data in the 24 hours ending 24 1212  Wt Readings from Last 3 Encounters:   24 (!) 316 lb 2.2 oz (143.4 kg)   09/10/20 285 lb 0.9 oz (129.3 kg)   20 285 lb (129.3 kg)       General: Awake and alert; in no acute distress  Cardiac: Regular rate and regular rhythm; no murmurs/rubs/gallops are appreciated  Lungs: Clear to auscultation bilaterally; no accessory muscle use  Abdomen: Soft, non-tender; bowel sounds are normoactive  Extremities: No clubbing/cyanosis; moves all 4 extremities normally    Current Facility-Administered Medications   Medication Dose Route Frequency    nitroglycerin (Nitrostat) 0.4 MG SL tab        nitroglycerin (Nitrostat) SL tab 0.4 mg  0.4 mg Sublingual Once    metoprolol (Lopressor) 5 mg/5mL injection 5 mg  5 mg Intravenous See Admin Instructions    Or    dilTIAZem (cardIZEM) 25 mg/5mL injection 5 mg  5 mg Intravenous See Admin Instructions    metoprolol tartrate (Lopressor)  tab 50 mg  50 mg Oral Once    Or    metoprolol tartrate (Lopressor) tab 100 mg  100 mg Oral Once    [START ON 1/13/2024] metoprolol tartrate (Lopressor) tab 50 mg  50 mg Oral Once    Or    [START ON 1/13/2024] metoprolol tartrate (Lopressor) tab 100 mg  100 mg Oral Once    [START ON 1/13/2024] metoprolol tartrate (Lopressor) tab 50 mg  50 mg Oral Once PRN    Or    [START ON 1/13/2024] metoprolol tartrate (Lopressor) tab 100 mg  100 mg Oral Once PRN    divalproex ER (Depakote ER) 24 hr tab 500 mg  500 mg Oral Daily    heparin (Porcine) 5000 UNIT/ML injection 7,500 Units  7,500 Units Subcutaneous Q8H SIMONE    acetaminophen (Tylenol Extra Strength) tab 500 mg  500 mg Oral Q4H PRN    ondansetron (Zofran) 4 MG/2ML injection 4 mg  4 mg Intravenous Q6H PRN    prochlorperazine (Compazine) 10 MG/2ML injection 5 mg  5 mg Intravenous Q8H PRN    gabapentin (Neurontin) cap 300 mg  300 mg Oral BID    mirtazapine (REMERON SOL-TAB) disintegrating tab 15 mg  15 mg Oral Nightly PRN    Brexpiprazole TABS 1 mg  1 mg Oral Daily    metoprolol tartrate (Lopressor) tab 50 mg  50 mg Oral 2x Daily(Beta Blocker)       Laboratory Data:  Lab Results   Component Value Date    WBC 7.2 01/12/2024    HGB 14.9 01/12/2024    HCT 44.5 01/12/2024    .0 01/12/2024     No results found for: \"INR\"  Lab Results   Component Value Date     01/12/2024    K 3.9 01/12/2024     01/12/2024    CO2 27.0 01/12/2024    BUN 14 01/12/2024    CREATSERUM 0.74 01/12/2024    GLU 97 01/12/2024    CA 9.0 01/12/2024    MG 2.2 01/12/2024       Telemetry: No malignant tachyarrhythmias or bradyarrhythmias      Thank you for allowing our practice to participate in the care of your patient. Please do not hesitate to contact me if you have any questions.    Alex Duran MD  1/12/2024  12:12 PM

## 2024-01-12 NOTE — PLAN OF CARE
Assumed care of pt at 1930 on 1/11.   A/Ox4, up independently.   Room air. NSR on tele. No complaints of shortness of breath.   Pt endorses chest discomfort. PRN tylenol given.   Scheduled CTA gated of coronary arteries 1/12.   Continent of bladder and bowel.   Fall precautions in place. Call light in reach. Pt updated on plan of care.    Problem: Patient/Family Goals  Goal: Patient/Family Long Term Goal  Description: Patient's Long Term Goal: \"go home\"     Interventions:  - medications as ordered by physician   - testing as ordered by physician   - See additional Care Plan goals for specific interventions  Outcome: Progressing  Goal: Patient/Family Short Term Goal  Description: Patient's Short Term Goal: \"no chest pain\"     Interventions:   - medications as ordered by physician   - testing as ordered by physician   - See additional Care Plan goals for specific interventions  Outcome: Progressing     Problem: PAIN - ADULT  Goal: Verbalizes/displays adequate comfort level or patient's stated pain goal  Description: INTERVENTIONS:  - Encourage pt to monitor pain and request assistance  - Assess pain using appropriate pain scale  - Administer analgesics based on type and severity of pain and evaluate response  - Implement non-pharmacological measures as appropriate and evaluate response  - Consider cultural and social influences on pain and pain management  - Manage/alleviate anxiety  - Utilize distraction and/or relaxation techniques  - Monitor for opioid side effects  - Notify MD/LIP if interventions unsuccessful or patient reports new pain  - Anticipate increased pain with activity and pre-medicate as appropriate  Outcome: Progressing

## 2024-01-12 NOTE — PLAN OF CARE
Received patient at 0730. Alert and Oriented x4. Tele Rhythm NSR. Pt on RA. Breath sounds clear. Bed is locked and in low position. Call light and personal items within reach. No C/O chest pain or shortness of breath. Pt voiding with no issue. Pt ambulated in hallway room issue. Skin dry and intact. Reviewed plan of care and patient verbalizes understanding.     Plan:  CTA gated    Problem: Patient/Family Goals  Goal: Patient/Family Long Term Goal  Description: Patient's Long Term Goal: \"go home\"     Interventions:  - medications as ordered by physician   - testing as ordered by physician   - See additional Care Plan goals for specific interventions  Outcome: Progressing  Goal: Patient/Family Short Term Goal  Description: Patient's Short Term Goal: \"no chest pain\"     Interventions:   - medications as ordered by physician   - testing as ordered by physician   - See additional Care Plan goals for specific interventions  Outcome: Progressing     Problem: PAIN - ADULT  Goal: Verbalizes/displays adequate comfort level or patient's stated pain goal  Description: INTERVENTIONS:  - Encourage pt to monitor pain and request assistance  - Assess pain using appropriate pain scale  - Administer analgesics based on type and severity of pain and evaluate response  - Implement non-pharmacological measures as appropriate and evaluate response  - Consider cultural and social influences on pain and pain management  - Manage/alleviate anxiety  - Utilize distraction and/or relaxation techniques  - Monitor for opioid side effects  - Notify MD/LIP if interventions unsuccessful or patient reports new pain  - Anticipate increased pain with activity and pre-medicate as appropriate  Outcome: Progressing

## 2024-01-12 NOTE — PROGRESS NOTES
[facility] Note:  Anticoagulation Weight Dose Adjustment for heparin    Peewee Myers is a 28 year old patient who has been prescribed heparin 5000 units every 8 hours.      Estimated Creatinine Clearance: 142.2 mL/min (based on SCr of 0.71 mg/dL). Body mass index is 49.34 kg/m². Wt Readings from Last 1 Encounters:   01/11/24 (!) 142.9 kg (315 lb)        Per P&T approved dose adjustment protocol for weight and renal function, the dose will be adjusted to heparin 7500 units every 8 hours.    Thank you,    Maranda Carter, PharmD  1/11/2024  6:19 PM

## 2024-01-12 NOTE — PROGRESS NOTES
[facility] Hospitalist Progress Note                                                                     [facility]      Peewee Myers  10/22/1995    SUBJECTIVE: Patient with no further chest pain, headache or blurry vision. Patient with no palpitations, shortness of breath, cough, nausea, vomiting, abdominal pain.     OBJECTIVE:  Temp:  [96.9 °F (36.1 °C)-98.5 °F (36.9 °C)] 98.5 °F (36.9 °C)  Pulse:  [] 65  Resp:  [16-28] 16  BP: (119-155)/() 130/88  SpO2:  [92 %-100 %] 98 %  Exam  Gen: No acute distress, alert and oriented x3  Pulm: Lungs clear bilaterally, normal respiratory effort, no crackles, no wheezing  CV: Heart with regular rate and rhythm, no murmur.   Abd: Abdomen soft, nontender, nondistended, bowel sounds present  MSK: No significant pitting edema or tenderness of the LE  Skin: no new rashes or lesions    Labs:   Recent Labs   Lab 01/11/24  1303 01/12/24  0746   WBC 10.5 7.2   HGB 15.8 14.9   MCV 86.4 87.8   .0 316.0       Recent Labs   Lab 01/11/24  1303 01/12/24  0746    135*   K 3.7 3.9    106   CO2 29.0 27.0   BUN 15 14   CREATSERUM 0.71 0.74   CA 9.0 9.0   MG  --  2.2   * 97       Recent Labs   Lab 01/11/24  1303   *   AST 43*   ALB 4.4       No results for input(s): \"PGLU\" in the last 168 hours.    Meds:   Scheduled:    metoprolol tartrate  50 mg Oral Once    Or    metoprolol tartrate  100 mg Oral Once    [START ON 1/13/2024] metoprolol tartrate  50 mg Oral Once    Or    [START ON 1/13/2024] metoprolol tartrate  100 mg Oral Once    divalproex ER  500 mg Oral Daily    heparin  7,500 Units Subcutaneous Q8H SIMONE    gabapentin  300 mg Oral BID    Brexpiprazole  1 mg Oral Daily    metoprolol tartrate  50 mg Oral 2x Daily(Beta Blocker)     Continuous Infusions:   PRN: metoprolol tartrate **OR** metoprolol tartrate, metoprolol tartrate **OR** metoprolol tartrate, [START ON 1/13/2024] metoprolol tartrate **OR**  [START ON 1/13/2024] metoprolol tartrate, acetaminophen, ondansetron, prochlorperazine, mirtazapine    ASSESSMENT / PLAN:   28 year old male with history of anxiety, depression, hypertension, obesity presenting with chest pain.     Chest Pain--> resolved  -etiology uncertain  -pt with cocaine use, possible cause of sx  -trop elevated  -cardiology following  -CTA chest pending--> no acute pathology   -echo pending--> no acute pathology   -coronary CTA tomorrow pending above evaluation--> pending today   -monitor on tele     Headache--> resolved  Blurry Vision--> resolved  -etiology uncertain  -possibly secondary to drug use  -echo pending to evaluate for possible vegetations--> no acute findings  -ct brain neg for acute pathology      Elevated LFT  -likely secondary to fatty liver  -recheck as outpt and if remains elevated outpt GI follow up     HTN  -sbp stable  -lisinopril     Anxiety  Depression  -Depakote  -gabapentin      Quality:  DVT Prophylaxis: subcutaneous heparin  CODE status:   Craig: none     Plan of care discussed with patient and staff     Dispo:  discharge pending cardiology eval today      Sonu Patel MD  [facility]  [phone]

## 2024-01-12 NOTE — PROGRESS NOTES
Admisison ortho's negative.   Skin check CDI. Skin check preformed with Rosmery PCT.        NURSING ADMISSION NOTE      Patient admitted via Cart  Oriented to room.  Safety precautions initiated.  Bed in low position.  Call light in reach.  Admission navigator complete. Informed oncoming RN of need to get prn gated orders from cardiology and hospitalist for any further orders.   Pt A&Ox4. Pt having 7/10 pain across chest. PRN tylenol give. If pain persist endorsed to night time RN. Lungs clear on RA. NSR on tele. CTA gated in AM. Pt up adlib.          01/11/24 1826 01/11/24 1827 01/11/24 1828   Vital Signs   Temp src Oral  --   --    Pulse 79 75 80   Heart Rate Source Monitor Monitor Monitor   Resp 18 18 18   Respiratory Quality Normal Normal Normal   /88 (!) 152/96 (!) 147/96   MAP (mmHg) (!) 107 (!) 110 (!) 109   BP Location Right arm Right arm Right arm   BP Method Automatic Automatic Automatic   Patient Position Lying Sitting Standing

## 2024-01-12 NOTE — PLAN OF CARE
Pt is Ok to discharge per primary and consults. Discharge instructions including medications and follow ups given and patient verbalizes understanding. IV removed, tele monitor discontinued. All belongings taken with pt/ Pt transported off unit via wheelchair.

## 2024-01-19 NOTE — DISCHARGE SUMMARY
[facility]    DISCHARGE SUMMARY     Peewee Myers Patient Status:  Observation    10/22/1995 MRN [id number]   Location [facility] 2NE-A Attending No att. providers found   Hosp Day # 0 PCP Wilver Martinez MD     Date of Admission: 2024  Date of Discharge: 2024  Discharge Disposition: Home or Self Care    Discharge Diagnosis: Chest Pain with negative cardiac evaluation     History of Present Illness:  28 year old male with history of anxiety, depression, hypertension, obesity presenting with chest pain. Patient troponin was mildly elevated. Cardiology was consulted. A coronary CTA was ordered. Patient symptoms had resolved and patient CTA showed no significant disease. Patient was also advised to stop all use of cocaine as this could have been a cause of his symptoms as well. Patient clinically stable, labs stable and vitals stable for discharge. Patient agreeable with discharge.     Discharge Medication List:     Discharge Medications        CONTINUE taking these medications        Instructions Prescription details   Brexpiprazole 1 MG Tabs      Take 1 mg by mouth daily.   Refills: 0     divalproex  MG Tb24  Commonly known as: Depakote ER      Take 1 tablet (500 mg total) by mouth daily.   Refills: 0     gabapentin 300 MG Caps  Commonly known as: Neurontin      Take 1 capsule (300 mg total) by mouth in the morning and 1 capsule (300 mg total) before bedtime.   Refills: 0     mirtazapine 15 MG Tabs  Commonly known as: Remeron      Take 1 tablet (15 mg total) by mouth daily as needed (sleep).   Refills: 0              Follow-up appointment:   Wilver Martinez MD  [address] [address] [phone]    Follow up in 1 week(s)      Appointments for Next 30 Days 2024 - 2024      None            Vital signs:   stable    Physical Exam:    General: No acute distress.   Respiratory: Clear to auscultation bilaterally. No wheezes. No  crackles  Cardiovascular: S1, S2. Regular rate and rhythm. No murmurs  Abdomen: Soft, nontender, nondistended.  Positive bowel sounds. No rebound or guarding.  Musculoskeletal: Moves all extremities.  Extremities: No edema. No tenderness  -----------------------------------------------------------------------------------------------  PATIENT DISCHARGE INSTRUCTIONS: See electronic chart    ASSESSMENT / PLAN:   28 year old male with history of anxiety, depression, hypertension, obesity presenting with chest pain.     Chest Pain--> resolved  -etiology uncertain  -pt with cocaine use, possible cause of sx  -trop elevated  -cardiology consulted--> Coronary CTA --> no acute disease  -CTA chest pending --> neg for acute pathology   -echo pending--> no acute pathology   -stable for dc by cardiology      Headache--> resolved  Blurry Vision--> resolved  -etiology uncertain  -possibly secondary to drug use  -echo pending to evaluate for possible vegetations--> no acute pathology   -ct brain neg for acute pathology      Elevated LFT--> outpt follow up  -likely secondary to fatty liver  -recheck as outpt and if remains elevated outpt GI follow up     HTN  -sbp stable  -lisinopril     Anxiety  Depression  -Depakote  -gabapentin      Plan of care discussed with patient and staff     Dispo: discharge.      Sonu Patel MD  [facility]  [phone]    Time spent:  > 35 minutes

## 2024-07-16 ENCOUNTER — APPOINTMENT (OUTPATIENT)
Dept: GENERAL RADIOLOGY | Facility: HOSPITAL | Age: 29
End: 2024-07-16
Payer: COMMERCIAL

## 2024-07-16 LAB
BASOPHILS # BLD AUTO: 0.07 X10(3) UL (ref 0–0.2)
BASOPHILS NFR BLD AUTO: 0.7 %
EOSINOPHIL # BLD AUTO: 0.2 X10(3) UL (ref 0–0.7)
EOSINOPHIL NFR BLD AUTO: 2 %
ERYTHROCYTE [DISTWIDTH] IN BLOOD BY AUTOMATED COUNT: 12.9 %
HCT VFR BLD AUTO: 42 %
HGB BLD-MCNC: 14.6 G/DL
IMM GRANULOCYTES # BLD AUTO: 0.05 X10(3) UL (ref 0–1)
IMM GRANULOCYTES NFR BLD: 0.5 %
LYMPHOCYTES # BLD AUTO: 3.28 X10(3) UL (ref 1–4)
LYMPHOCYTES NFR BLD AUTO: 32.8 %
MCH RBC QN AUTO: 30.3 PG (ref 26–34)
MCHC RBC AUTO-ENTMCNC: 34.8 G/DL (ref 31–37)
MCV RBC AUTO: 87.1 FL
MONOCYTES # BLD AUTO: 0.64 X10(3) UL (ref 0.1–1)
MONOCYTES NFR BLD AUTO: 6.4 %
NEUTROPHILS # BLD AUTO: 5.77 X10 (3) UL (ref 1.5–7.7)
NEUTROPHILS # BLD AUTO: 5.77 X10(3) UL (ref 1.5–7.7)
NEUTROPHILS NFR BLD AUTO: 57.6 %
PLATELET # BLD AUTO: 290 10(3)UL (ref 150–450)
RBC # BLD AUTO: 4.82 X10(6)UL
WBC # BLD AUTO: 10 X10(3) UL (ref 4–11)

## 2024-07-16 PROCEDURE — 93010 ELECTROCARDIOGRAM REPORT: CPT

## 2024-07-16 PROCEDURE — 84484 ASSAY OF TROPONIN QUANT: CPT

## 2024-07-16 PROCEDURE — 71045 X-RAY EXAM CHEST 1 VIEW: CPT

## 2024-07-16 PROCEDURE — 80053 COMPREHEN METABOLIC PANEL: CPT

## 2024-07-16 PROCEDURE — 80061 LIPID PANEL: CPT | Performed by: EMERGENCY MEDICINE

## 2024-07-16 PROCEDURE — 99285 EMERGENCY DEPT VISIT HI MDM: CPT

## 2024-07-16 PROCEDURE — 80053 COMPREHEN METABOLIC PANEL: CPT | Performed by: EMERGENCY MEDICINE

## 2024-07-16 PROCEDURE — 93005 ELECTROCARDIOGRAM TRACING: CPT

## 2024-07-16 PROCEDURE — 80061 LIPID PANEL: CPT

## 2024-07-16 PROCEDURE — 36415 COLL VENOUS BLD VENIPUNCTURE: CPT

## 2024-07-16 PROCEDURE — 85025 COMPLETE CBC W/AUTO DIFF WBC: CPT

## 2024-07-16 PROCEDURE — 84484 ASSAY OF TROPONIN QUANT: CPT | Performed by: EMERGENCY MEDICINE

## 2024-07-16 PROCEDURE — 85025 COMPLETE CBC W/AUTO DIFF WBC: CPT | Performed by: EMERGENCY MEDICINE

## 2024-07-16 RX ORDER — LISINOPRIL 30 MG/1
30 TABLET ORAL DAILY
COMMUNITY
Start: 2024-04-18

## 2024-07-17 ENCOUNTER — HOSPITAL ENCOUNTER (EMERGENCY)
Facility: HOSPITAL | Age: 29
Discharge: HOME | End: 2024-07-17
Attending: EMERGENCY MEDICINE
Payer: COMMERCIAL

## 2024-07-17 VITALS
TEMPERATURE: 99 F | WEIGHT: 315 LBS | HEART RATE: 87 BPM | DIASTOLIC BLOOD PRESSURE: 81 MMHG | SYSTOLIC BLOOD PRESSURE: 129 MMHG | RESPIRATION RATE: 18 BRPM | BODY MASS INDEX: 47.74 KG/M2 | HEIGHT: 68 IN | OXYGEN SATURATION: 98 %

## 2024-07-17 DIAGNOSIS — J18.9 PNEUMONIA OF RIGHT LOWER LOBE DUE TO INFECTIOUS ORGANISM: Primary | ICD-10-CM

## 2024-07-17 DIAGNOSIS — R79.89 ELEVATED TROPONIN: ICD-10-CM

## 2024-07-17 LAB
ALBUMIN SERPL-MCNC: 4.5 G/DL (ref 3.2–4.8)
ALBUMIN/GLOB SERPL: 1.6 {RATIO} (ref 1–2)
ALP LIVER SERPL-CCNC: 110 U/L
ALT SERPL-CCNC: 72 U/L
ANION GAP SERPL CALC-SCNC: 3 MMOL/L (ref 0–18)
AST SERPL-CCNC: 42 U/L (ref ?–34)
ATRIAL RATE: 85 BPM
BILIRUB SERPL-MCNC: 0.5 MG/DL (ref 0.3–1.2)
BUN BLD-MCNC: 8 MG/DL (ref 9–23)
CALCIUM BLD-MCNC: 9.4 MG/DL (ref 8.7–10.4)
CHLORIDE SERPL-SCNC: 107 MMOL/L (ref 98–112)
CHOLEST SERPL-MCNC: 194 MG/DL (ref ?–200)
CO2 SERPL-SCNC: 28 MMOL/L (ref 21–32)
CREAT BLD-MCNC: 0.71 MG/DL
EGFRCR SERPLBLD CKD-EPI 2021: 127 ML/MIN/1.73M2 (ref 60–?)
FLUAV + FLUBV RNA SPEC NAA+PROBE: NEGATIVE
FLUAV + FLUBV RNA SPEC NAA+PROBE: NEGATIVE
GLOBULIN PLAS-MCNC: 2.9 G/DL (ref 2.8–4.4)
GLUCOSE BLD-MCNC: 108 MG/DL (ref 70–99)
HDLC SERPL-MCNC: 40 MG/DL (ref 40–59)
LACTATE SERPL-SCNC: 1.3 MMOL/L (ref 0.5–2)
LDLC SERPL CALC-MCNC: 116 MG/DL (ref ?–100)
NONHDLC SERPL-MCNC: 154 MG/DL (ref ?–130)
OSMOLALITY SERPL CALC.SUM OF ELEC: 285 MOSM/KG (ref 275–295)
P AXIS: 43 DEGREES
P-R INTERVAL: 180 MS
POTASSIUM SERPL-SCNC: 3.8 MMOL/L (ref 3.5–5.1)
PROT SERPL-MCNC: 7.4 G/DL (ref 5.7–8.2)
Q-T INTERVAL: 370 MS
QRS DURATION: 94 MS
QTC CALCULATION (BEZET): 440 MS
R AXIS: 14 DEGREES
RSV RNA SPEC NAA+PROBE: NEGATIVE
SARS-COV-2 RNA RESP QL NAA+PROBE: NOT DETECTED
SODIUM SERPL-SCNC: 138 MMOL/L (ref 136–145)
T AXIS: 31 DEGREES
TRIGL SERPL-MCNC: 214 MG/DL (ref 30–149)
TROPONIN I SERPL HS-MCNC: 89 NG/L
TROPONIN I SERPL HS-MCNC: 97 NG/L
VENTRICULAR RATE: 85 BPM
VLDLC SERPL CALC-MCNC: 38 MG/DL (ref 0–30)

## 2024-07-17 PROCEDURE — 87040 BLOOD CULTURE FOR BACTERIA: CPT | Performed by: EMERGENCY MEDICINE

## 2024-07-17 PROCEDURE — 0241U SARS-COV-2/FLU A AND B/RSV BY PCR (GENEXPERT): CPT | Performed by: EMERGENCY MEDICINE

## 2024-07-17 PROCEDURE — 83605 ASSAY OF LACTIC ACID: CPT | Performed by: EMERGENCY MEDICINE

## 2024-07-17 PROCEDURE — 84484 ASSAY OF TROPONIN QUANT: CPT | Performed by: EMERGENCY MEDICINE

## 2024-07-17 PROCEDURE — 96367 TX/PROPH/DG ADDL SEQ IV INF: CPT

## 2024-07-17 PROCEDURE — 96365 THER/PROPH/DIAG IV INF INIT: CPT

## 2024-07-17 RX ORDER — LEVOFLOXACIN 750 MG/1
750 TABLET, FILM COATED ORAL DAILY
Qty: 10 TABLET | Refills: 0 | Status: SHIPPED | OUTPATIENT
Start: 2024-07-17 | End: 2024-07-27

## 2024-07-17 NOTE — ED PROVIDER NOTES
Patient Seen in: [facility] Emergency Department      History     Chief Complaint   Patient presents with    Chest Pain Angina     Stated Complaint: cp    Subjective:   Is a 28-year-old male who presents emergency room for chest pain has been going on for months.  Patient reports for the last 4 days been increasingly.  He was out of town over the weekend camping and did not take his blood pressure medicine.  Patient had been seen in the emergency room here in January and has a follow-up appointment with cardiology for the first time on the 24th.  He is found to have a right lower lobe pneumonia on chest x-ray.  He is speaking in full complete sentences he reports he does have a history of FAP and had ductus arteriosus that he is following up with cardiology for on the 24th.  Patient reports he also discontinued cocaine use 2 months ago he is decreased his tobacco use from a pack a day down to a pack every 5 days.  He reports no alcohol use for the last 2 months either.    The history is provided by the patient.           Objective:   Past Medical History:    Anxiety    Back pain    Calculus of kidney    Cervical spondylosis    Depression    Essential hypertension    High blood pressure    Lactose intolerance    Obesity    Suicidal thoughts              History reviewed. No pertinent surgical history.             Social History     Socioeconomic History    Marital status:    Tobacco Use    Smoking status: Every Day     Current packs/day: 0.50     Average packs/day: 0.5 packs/day for 10.0 years (5.0 ttl pk-yrs)     Types: Cigarettes    Smokeless tobacco: Never   Vaping Use    Vaping status: Former    Substances: THC, flour/ediple   Substance and Sexual Activity    Alcohol use: Yes     Alcohol/week: 8.0 standard drinks of alcohol     Types: 5 Cans of beer, 3 Shots of liquor per week     Comment: quarter of adonis a weekend    Drug use: Yes     Frequency: 1.0 times per week     Types: \"Crack\" cocaine,  Cannabis     Social Determinants of Health     Financial Resource Strain: Not on File (10/5/2022)    Received from [facility]    Financial Resource Strain     Financial Resource Strain: 0   Food Insecurity: No Food Insecurity (1/11/2024)    Food Insecurity     Food Insecurity: Never true   Transportation Needs: No Transportation Needs (1/11/2024)    Transportation Needs     Lack of Transportation: No   Physical Activity: Not on File (10/5/2022)    Received from [facility]    Physical Activity     Physical Activity: 0   Stress: Not on File (10/5/2022)    Received from [facility]    Stress     Stress: 0   Social Connections: Not on File (10/5/2022)    Received from [facility]    Social Connections     Social Connections and Isolation: 0   Housing Stability: Low Risk  (1/11/2024)    Housing Stability     Housing Instability: No              Review of Systems   Respiratory:  Positive for chest tightness.        Positive for stated Chief Complaint: Chest Pain Angina    Other systems are as noted in HPI.  Constitutional and vital signs reviewed.      All other systems reviewed and negative except as noted above.    Physical Exam     ED Triage Vitals [07/16/24 2326]   /85   Pulse 86   Resp 20   Temp 98.6 °F (37 °C)   Temp src Oral   SpO2 97 %   O2 Device None (Room air)       Current Vitals:   Vital Signs  BP: 129/81  Pulse: 86  Resp: 20  Temp: 98.6 °F (37 °C)  Temp src: Oral  MAP (mmHg): 95    Oxygen Therapy  SpO2: 100 %  O2 Device: None (Room air)            Physical Exam  Vitals and nursing note reviewed.   Constitutional:       General: He is not in acute distress.     Appearance: He is well-developed and normal weight. He is not toxic-appearing.   HENT:      Head: Normocephalic and atraumatic.   Eyes:      Extraocular Movements: Extraocular movements intact.      Pupils: Pupils are equal, round, and reactive to light.   Cardiovascular:      Rate and Rhythm: Normal rate and regular rhythm.      Heart  sounds: Normal heart sounds.   Pulmonary:      Effort: Pulmonary effort is normal. No tachypnea or respiratory distress.      Breath sounds: Normal breath sounds. No wheezing.   Chest:      Chest wall: No mass or tenderness.   Abdominal:      General: Bowel sounds are normal.      Palpations: Abdomen is soft. There is no mass.      Tenderness: There is no guarding.      Comments: obese   Musculoskeletal:         General: Normal range of motion.   Skin:     General: Skin is warm.      Capillary Refill: Capillary refill takes less than 2 seconds.   Neurological:      General: No focal deficit present.      Mental Status: He is alert and oriented to person, place, and time.   Psychiatric:         Mood and Affect: Mood normal.         Behavior: Behavior normal.               ED Course     Labs Reviewed   COMP METABOLIC PANEL (14) - Abnormal; Notable for the following components:       Result Value    Glucose 108 (*)     BUN 8 (*)     AST 42 (*)     ALT 72 (*)     All other components within normal limits   TROPONIN I HIGH SENSITIVITY - Abnormal; Notable for the following components:    Troponin I (High Sensitivity) 97 (*)     All other components within normal limits   LIPID PANEL - Abnormal; Notable for the following components:    Triglycerides 214 (*)     LDL Cholesterol 116 (*)     VLDL 38 (*)     Non HDL Chol 154 (*)     All other components within normal limits   TROPONIN I HIGH SENSITIVITY - Abnormal; Notable for the following components:    Troponin I (High Sensitivity) 89 (*)     All other components within normal limits   LACTIC ACID, PLASMA - Normal   SARS-COV-2/FLU A AND B/RSV BY PCR (GENEXPERT) - Normal    Narrative:     This test is intended for the qualitative detection and differentiation of SARS-CoV-2, influenza A, influenza B, and respiratory syncytial virus (RSV) viral RNA in nasopharyngeal or nares swabs from individuals suspected of respiratory viral infection consistent with COVID-19 by their  healthcare provider. Signs and symptoms of respiratory viral infection due to SARS-CoV-2, influenza, and RSV can be similar.    Test performed using the Xpert Xpress SARS-CoV-2/FLU/RSV (real time RT-PCR)  assay on the [employer] instrument, [address], [place] [address].   This test is being used under the Food and Drug Administration's Emergency Use Authorization.    The authorized Fact Sheet for Healthcare Providers for this assay is available upon request from the laboratory.   CBC WITH DIFFERENTIAL WITH PLATELET    Narrative:     The following orders were created for panel order CBC With Differential With Platelet.  Procedure                               Abnormality         Status                     ---------                               -----------         ------                     CBC W/ DIFFERENTIAL[[id number]]                              Final result                 Please view results for these tests on the individual orders.   RAINBOW DRAW BLUE   BLOOD CULTURE   BLOOD CULTURE   CBC W/ DIFFERENTIAL     EKG    Rate, intervals and axes as noted on EKG Report.  Rate: 85  Rhythm: Sinus Rhythm  Reading: Normal sinus rhythm no ST elevation [place] interval of 180 QRS 94 QTc of 440 with axes 43/14/31                 XR CHEST AP PORTABLE  (CPT=71045)    Result Date: 7/17/2024  PROCEDURE:  XR CHEST AP PORTABLE  (CPT=71045)  TECHNIQUE:  AP chest radiograph was obtained.  COMPARISON:  [facility] , XR, XR CHEST AP PORTABLE  (CPT=71045), 1/11/2024, 1:19 PM.  INDICATIONS:  cp  PATIENT STATED HISTORY: (As transcribed by Technologist)  Patient shared he has chest pain.    FINDINGS:  Retrocardiac left lower lobe opacity is noted with suggestion of air bronchograms.  This could represent left lower lobe pneumonia.  Remainder of lungs is clear.  Heart size is within normal limits.  Mediastinum and hugo are are unremarkable.  Chest wall structures are unremarkable.            CONCLUSION:  Retrocardiac density with possible air  bronchograms could represent left lower lobe pneumonia.   LOCATION:  [facility]      Dictated by (CST): Cash Escalante MD on 7/17/2024 at 0:06 AM     Finalized by (CST): Cash Escalante MD on 7/17/2024 at 0:08 AM               MDM      Social -positive tobacco, quit drinking 2 months ago etoh, quit marijuana and cocaine 2 months ago drugs  Family History-noncontributory  Past Medical History-depression, hypertension, kidney stones, obesity anxiety, patent ductus arteriosus per patient    Differential diagnosis before testing included chest pain, pneumonia, anxiety, deconditioning    Co-morbidities that add to the complexity of management include: Patient found to have chest x-ray that is consistent with right lower lobe pneumonia    Testing ordered during this visit included none labs blood cultures lactic troponin x 2 chest x-ray and EKG    Radiographic images  I personally reviewed the radiographs and my individual interpretation shows right lower lobe pneumonia  I also reviewed the official reports that showed XR CHEST AP PORTABLE  (CPT=71045)    Result Date: 7/17/2024  PROCEDURE:  XR CHEST AP PORTABLE  (CPT=71045)  TECHNIQUE:  AP chest radiograph was obtained.  COMPARISON:  [facility] , [facility], XR CHEST AP PORTABLE  (CPT=71045), 1/11/2024, 1:19 PM.  INDICATIONS:  cp  PATIENT STATED HISTORY: (As transcribed by Technologist)  Patient shared he has chest pain.    FINDINGS:  Retrocardiac left lower lobe opacity is noted with suggestion of air bronchograms.  This could represent left lower lobe pneumonia.  Remainder of lungs is clear.  Heart size is within normal limits.  Mediastinum and hugo are are unremarkable.  Chest wall structures are unremarkable.            CONCLUSION:  Retrocardiac density with possible air bronchograms could represent left lower lobe pneumonia.   LOCATION:  [facility]      Dictated by (CST): Cash Escalante MD on 7/17/2024 at 0:06 AM     Finalized by (CST): Cash Escalante MD on 7/17/2024 at 0:08 AM           External chart review showed over in epic system shows patient had an echo in January of this year however I do not see a mention of a patent ductus arteriosus in the charting    History obtained by an independent source included from patient    Discussion of management with patient    Social determinants of health that affect care include not applicable      Medications Provided: Rocephin azithromycin    Course of Events during Emergency Room Visit include 28-year-old male presents emergency room with chest pain and swelling for months.  [facility] patient is scheduled to see cardiology on the 24th of this month for follow-up.  He is found to have a right lower lobe pneumonia on chest x-ray his EKG is unremarkable.  I will get a second set of troponins and speak with cardiology regarding this patient    Radiology as the patient's second troponin has decreased.  Given his age and low suspicion for cardiac causes within normal EKG they are okay with the patient being discharged home and following up with cardiology as scheduled on 24th of this month.  Patient clarified that he is following up with [facility] cardiology.  Patient is okay with this plan.  He was offered the option to stay for repeat troponins but he declined that option.    Patient be discharged home on oral Levaquin for the pneumonia.  Disposition:        Discharge  I have discussed with the patient the results of test, differential diagnosis, treatment plan, warning signs and symptoms which should prompt immediate return.  They expressed understanding of these instructions and agrees to the following plan provided.  They were given written discharge instructions and agrees to return for any concerns and voiced understanding and all questions were answered.                                      Medical Decision Making      Disposition and Plan     Clinical Impression:  1. Pneumonia of right lower  lobe due to infectious organism    2. Elevated troponin         Disposition:  Discharge  7/17/2024  2:54 am    Follow-up:  Guerline Cordoba  [address] [facility] [address] [phone]    Schedule an appointment as soon as possible for a visit            Medications Prescribed:  Current Discharge Medication List        START taking these medications    Details   levoFLOXacin 750 MG Oral Tab Take 1 tablet (750 mg total) by mouth daily for 10 days.  Qty: 10 tablet, Refills: 0                                             Bandemia

## 2024-07-17 NOTE — ED INITIAL ASSESSMENT (HPI)
Pt to ED for c/o left-sided chest pain x couple months. Pt states for the last 4 days the pain has been more intense and tight. Pt states last June he was seen in the ED at [facility], was recommended to ff-up with Cardiologist which Pt has an appointment on 7/24/24. Pt denies SOB.

## 2024-07-18 ENCOUNTER — APPOINTMENT (OUTPATIENT)
Dept: GENERAL RADIOLOGY | Facility: HOSPITAL | Age: 29
End: 2024-07-18
Payer: COMMERCIAL

## 2024-07-18 ENCOUNTER — HOSPITAL ENCOUNTER (OUTPATIENT)
Facility: HOSPITAL | Age: 29
Setting detail: OBSERVATION
Discharge: HOME | End: 2024-07-20
Attending: EMERGENCY MEDICINE | Admitting: STUDENT IN AN ORGANIZED HEALTH CARE EDUCATION/TRAINING PROGRAM
Payer: COMMERCIAL

## 2024-07-18 DIAGNOSIS — R79.89 ELEVATED TROPONIN: Primary | ICD-10-CM

## 2024-07-18 DIAGNOSIS — R07.9 ACUTE CHEST PAIN: ICD-10-CM

## 2024-07-18 PROBLEM — E87.1 HYPONATREMIA: Status: ACTIVE | Noted: 2024-07-18

## 2024-07-18 LAB
ALBUMIN SERPL-MCNC: 4.6 G/DL (ref 3.2–4.8)
ALBUMIN/GLOB SERPL: 1.4 {RATIO} (ref 1–2)
ALP LIVER SERPL-CCNC: 137 U/L
ALT SERPL-CCNC: 82 U/L
ANION GAP SERPL CALC-SCNC: 4 MMOL/L (ref 0–18)
APTT PPP: 28.4 SECONDS (ref 23–36)
AST SERPL-CCNC: 48 U/L (ref ?–34)
BASOPHILS # BLD AUTO: 0.07 X10(3) UL (ref 0–0.2)
BASOPHILS NFR BLD AUTO: 0.7 %
BILIRUB SERPL-MCNC: 0.5 MG/DL (ref 0.3–1.2)
BUN BLD-MCNC: 9 MG/DL (ref 9–23)
CALCIUM BLD-MCNC: 9.7 MG/DL (ref 8.7–10.4)
CHLORIDE SERPL-SCNC: 106 MMOL/L (ref 98–112)
CO2 SERPL-SCNC: 25 MMOL/L (ref 21–32)
CREAT BLD-MCNC: 0.74 MG/DL
EGFRCR SERPLBLD CKD-EPI 2021: 125 ML/MIN/1.73M2 (ref 60–?)
EOSINOPHIL # BLD AUTO: 0.16 X10(3) UL (ref 0–0.7)
EOSINOPHIL NFR BLD AUTO: 1.6 %
ERYTHROCYTE [DISTWIDTH] IN BLOOD BY AUTOMATED COUNT: 12.9 %
GLOBULIN PLAS-MCNC: 3.2 G/DL (ref 2.8–4.4)
GLUCOSE BLD-MCNC: 87 MG/DL (ref 70–99)
HCT VFR BLD AUTO: 42.8 %
HGB BLD-MCNC: 14.9 G/DL
IMM GRANULOCYTES # BLD AUTO: 0.09 X10(3) UL (ref 0–1)
IMM GRANULOCYTES NFR BLD: 0.9 %
INR BLD: 0.95 (ref 0.8–1.2)
LYMPHOCYTES # BLD AUTO: 2.57 X10(3) UL (ref 1–4)
LYMPHOCYTES NFR BLD AUTO: 25.9 %
MCH RBC QN AUTO: 30 PG (ref 26–34)
MCHC RBC AUTO-ENTMCNC: 34.8 G/DL (ref 31–37)
MCV RBC AUTO: 86.1 FL
MONOCYTES # BLD AUTO: 0.65 X10(3) UL (ref 0.1–1)
MONOCYTES NFR BLD AUTO: 6.6 %
NEUTROPHILS # BLD AUTO: 6.38 X10 (3) UL (ref 1.5–7.7)
NEUTROPHILS # BLD AUTO: 6.38 X10(3) UL (ref 1.5–7.7)
NEUTROPHILS NFR BLD AUTO: 64.3 %
OSMOLALITY SERPL CALC.SUM OF ELEC: 278 MOSM/KG (ref 275–295)
PLATELET # BLD AUTO: 305 10(3)UL (ref 150–450)
POTASSIUM SERPL-SCNC: 3.9 MMOL/L (ref 3.5–5.1)
PROT SERPL-MCNC: 7.8 G/DL (ref 5.7–8.2)
PROTHROMBIN TIME: 12.7 SECONDS (ref 11.6–14.8)
RBC # BLD AUTO: 4.97 X10(6)UL
SODIUM SERPL-SCNC: 135 MMOL/L (ref 136–145)
TROPONIN I SERPL HS-MCNC: 94 NG/L
WBC # BLD AUTO: 9.9 X10(3) UL (ref 4–11)

## 2024-07-18 PROCEDURE — 71045 X-RAY EXAM CHEST 1 VIEW: CPT | Performed by: EMERGENCY MEDICINE

## 2024-07-18 PROCEDURE — 99222 1ST HOSP IP/OBS MODERATE 55: CPT | Performed by: STUDENT IN AN ORGANIZED HEALTH CARE EDUCATION/TRAINING PROGRAM

## 2024-07-18 RX ORDER — HEPARIN SODIUM AND DEXTROSE 10000; 5 [USP'U]/100ML; G/100ML
1000 INJECTION INTRAVENOUS ONCE
Status: COMPLETED | OUTPATIENT
Start: 2024-07-18 | End: 2024-07-20

## 2024-07-18 RX ORDER — HEPARIN SODIUM AND DEXTROSE 10000; 5 [USP'U]/100ML; G/100ML
INJECTION INTRAVENOUS CONTINUOUS
Status: DISCONTINUED | OUTPATIENT
Start: 2024-07-19 | End: 2024-07-20

## 2024-07-18 RX ORDER — LEVOFLOXACIN 750 MG/1
750 TABLET, FILM COATED ORAL DAILY
Status: DISCONTINUED | OUTPATIENT
Start: 2024-07-19 | End: 2024-07-20

## 2024-07-18 RX ORDER — HYDROMORPHONE HYDROCHLORIDE 1 MG/ML
0.5 INJECTION, SOLUTION INTRAMUSCULAR; INTRAVENOUS; SUBCUTANEOUS EVERY 30 MIN PRN
Status: ACTIVE | OUTPATIENT
Start: 2024-07-18 | End: 2024-07-19

## 2024-07-18 RX ORDER — ONDANSETRON 2 MG/ML
4 INJECTION INTRAMUSCULAR; INTRAVENOUS EVERY 6 HOURS PRN
Status: DISCONTINUED | OUTPATIENT
Start: 2024-07-18 | End: 2024-07-20

## 2024-07-18 RX ORDER — NITROGLYCERIN 0.4 MG/1
0.4 TABLET SUBLINGUAL EVERY 5 MIN PRN
Status: DISCONTINUED | OUTPATIENT
Start: 2024-07-18 | End: 2024-07-20

## 2024-07-18 RX ORDER — SENNOSIDES 8.6 MG
17.2 TABLET ORAL NIGHTLY PRN
Status: DISCONTINUED | OUTPATIENT
Start: 2024-07-18 | End: 2024-07-20

## 2024-07-18 RX ORDER — ENEMA 19; 7 G/133ML; G/133ML
1 ENEMA RECTAL ONCE AS NEEDED
Status: DISCONTINUED | OUTPATIENT
Start: 2024-07-18 | End: 2024-07-20

## 2024-07-18 RX ORDER — ONDANSETRON 2 MG/ML
4 INJECTION INTRAMUSCULAR; INTRAVENOUS EVERY 4 HOURS PRN
Status: ACTIVE | OUTPATIENT
Start: 2024-07-18 | End: 2024-07-19

## 2024-07-18 RX ORDER — BISACODYL 10 MG
10 SUPPOSITORY, RECTAL RECTAL
Status: DISCONTINUED | OUTPATIENT
Start: 2024-07-18 | End: 2024-07-20

## 2024-07-18 RX ORDER — ASPIRIN 325 MG
325 TABLET ORAL DAILY
Status: DISCONTINUED | OUTPATIENT
Start: 2024-07-19 | End: 2024-07-20

## 2024-07-18 RX ORDER — BENZONATATE 100 MG/1
200 CAPSULE ORAL 3 TIMES DAILY PRN
Status: DISCONTINUED | OUTPATIENT
Start: 2024-07-18 | End: 2024-07-20

## 2024-07-18 RX ORDER — ECHINACEA PURPUREA EXTRACT 125 MG
1 TABLET ORAL
Status: DISCONTINUED | OUTPATIENT
Start: 2024-07-18 | End: 2024-07-20

## 2024-07-18 RX ORDER — HEPARIN SODIUM 1000 [USP'U]/ML
5000 INJECTION, SOLUTION INTRAVENOUS; SUBCUTANEOUS ONCE
Status: COMPLETED | OUTPATIENT
Start: 2024-07-18 | End: 2024-07-18

## 2024-07-18 RX ORDER — POLYETHYLENE GLYCOL 3350 17 G/17G
17 POWDER, FOR SOLUTION ORAL DAILY PRN
Status: DISCONTINUED | OUTPATIENT
Start: 2024-07-18 | End: 2024-07-20

## 2024-07-18 RX ORDER — LEVOFLOXACIN 750 MG/1
750 TABLET, FILM COATED ORAL DAILY
Status: DISCONTINUED | OUTPATIENT
Start: 2024-07-18 | End: 2024-07-18

## 2024-07-18 RX ORDER — ASPIRIN 325 MG
325 TABLET ORAL DAILY
Status: DISCONTINUED | OUTPATIENT
Start: 2024-07-18 | End: 2024-07-18

## 2024-07-18 RX ORDER — ACETAMINOPHEN 500 MG
1000 TABLET ORAL EVERY 8 HOURS PRN
Status: DISCONTINUED | OUTPATIENT
Start: 2024-07-18 | End: 2024-07-20

## 2024-07-18 RX ORDER — ASPIRIN 81 MG/1
324 TABLET, CHEWABLE ORAL ONCE
Status: COMPLETED | OUTPATIENT
Start: 2024-07-18 | End: 2024-07-18

## 2024-07-18 RX ORDER — PROCHLORPERAZINE EDISYLATE 5 MG/ML
5 INJECTION INTRAMUSCULAR; INTRAVENOUS EVERY 8 HOURS PRN
Status: DISCONTINUED | OUTPATIENT
Start: 2024-07-18 | End: 2024-07-20

## 2024-07-18 NOTE — ED INITIAL ASSESSMENT (HPI)
Left sided chest pain radiating to left arm since last Friday nauseated . Patient said pain is like sharp and sqeezing in nature . Seen here on Tuesday with same problem diagnosed as pneumonia and started antibiotic . But  pain is getting worse

## 2024-07-19 ENCOUNTER — APPOINTMENT (OUTPATIENT)
Dept: INTERVENTIONAL RADIOLOGY/VASCULAR | Facility: HOSPITAL | Age: 29
End: 2024-07-19
Attending: INTERNAL MEDICINE
Payer: COMMERCIAL

## 2024-07-19 PROBLEM — F17.200 NICOTINE USE DISORDER: Status: ACTIVE | Noted: 2024-07-19

## 2024-07-19 PROBLEM — J18.9 PNEUMONIA DUE TO INFECTIOUS ORGANISM: Status: ACTIVE | Noted: 2024-07-19

## 2024-07-19 LAB
ANION GAP SERPL CALC-SCNC: 4 MMOL/L (ref 0–18)
APTT PPP: 31.1 SECONDS (ref 23–36)
APTT PPP: 32.3 SECONDS (ref 23–36)
ATRIAL RATE: 74 BPM
ATRIAL RATE: 97 BPM
BASOPHILS # BLD AUTO: 0.07 X10(3) UL (ref 0–0.2)
BASOPHILS NFR BLD AUTO: 0.9 %
BUN BLD-MCNC: 9 MG/DL (ref 9–23)
CALCIUM BLD-MCNC: 9.4 MG/DL (ref 8.7–10.4)
CHLORIDE SERPL-SCNC: 105 MMOL/L (ref 98–112)
CHOLEST SERPL-MCNC: 177 MG/DL (ref ?–200)
CO2 SERPL-SCNC: 26 MMOL/L (ref 21–32)
CREAT BLD-MCNC: 0.75 MG/DL
EGFRCR SERPLBLD CKD-EPI 2021: 125 ML/MIN/1.73M2 (ref 60–?)
EOSINOPHIL # BLD AUTO: 0.19 X10(3) UL (ref 0–0.7)
EOSINOPHIL NFR BLD AUTO: 2.5 %
ERYTHROCYTE [DISTWIDTH] IN BLOOD BY AUTOMATED COUNT: 12.8 %
GLUCOSE BLD-MCNC: 93 MG/DL (ref 70–99)
HCT VFR BLD AUTO: 41 %
HDLC SERPL-MCNC: 32 MG/DL (ref 40–59)
HGB BLD-MCNC: 14 G/DL
IMM GRANULOCYTES # BLD AUTO: 0.08 X10(3) UL (ref 0–1)
IMM GRANULOCYTES NFR BLD: 1 %
INR BLD: 0.99 (ref 0.8–1.2)
LDLC SERPL CALC-MCNC: 91 MG/DL (ref ?–100)
LYMPHOCYTES # BLD AUTO: 2.41 X10(3) UL (ref 1–4)
LYMPHOCYTES NFR BLD AUTO: 31.5 %
MAGNESIUM SERPL-MCNC: 1.9 MG/DL (ref 1.6–2.6)
MCH RBC QN AUTO: 29.9 PG (ref 26–34)
MCHC RBC AUTO-ENTMCNC: 34.1 G/DL (ref 31–37)
MCV RBC AUTO: 87.4 FL
MONOCYTES # BLD AUTO: 0.62 X10(3) UL (ref 0.1–1)
MONOCYTES NFR BLD AUTO: 8.1 %
NEUTROPHILS # BLD AUTO: 4.27 X10 (3) UL (ref 1.5–7.7)
NEUTROPHILS # BLD AUTO: 4.27 X10(3) UL (ref 1.5–7.7)
NEUTROPHILS NFR BLD AUTO: 56 %
NONHDLC SERPL-MCNC: 145 MG/DL (ref ?–130)
OSMOLALITY SERPL CALC.SUM OF ELEC: 278 MOSM/KG (ref 275–295)
P AXIS: 38 DEGREES
P AXIS: 51 DEGREES
P-R INTERVAL: 154 MS
P-R INTERVAL: 176 MS
PLATELET # BLD AUTO: 282 10(3)UL (ref 150–450)
POTASSIUM SERPL-SCNC: 3.9 MMOL/L (ref 3.5–5.1)
PROTHROMBIN TIME: 13.1 SECONDS (ref 11.6–14.8)
Q-T INTERVAL: 354 MS
Q-T INTERVAL: 400 MS
QRS DURATION: 92 MS
QRS DURATION: 92 MS
QTC CALCULATION (BEZET): 444 MS
QTC CALCULATION (BEZET): 449 MS
R AXIS: 11 DEGREES
R AXIS: 13 DEGREES
RBC # BLD AUTO: 4.69 X10(6)UL
SODIUM SERPL-SCNC: 135 MMOL/L (ref 136–145)
T AXIS: 25 DEGREES
T AXIS: 27 DEGREES
TRIGL SERPL-MCNC: 324 MG/DL (ref 30–149)
TROPONIN I SERPL HS-MCNC: 84 NG/L
TROPONIN I SERPL HS-MCNC: 89 NG/L
VENTRICULAR RATE: 74 BPM
VENTRICULAR RATE: 97 BPM
VLDLC SERPL CALC-MCNC: 53 MG/DL (ref 0–30)
WBC # BLD AUTO: 7.6 X10(3) UL (ref 4–11)

## 2024-07-19 PROCEDURE — B2151ZZ FLUOROSCOPY OF LEFT HEART USING LOW OSMOLAR CONTRAST: ICD-10-PCS | Performed by: INTERNAL MEDICINE

## 2024-07-19 PROCEDURE — B2111ZZ FLUOROSCOPY OF MULTIPLE CORONARY ARTERIES USING LOW OSMOLAR CONTRAST: ICD-10-PCS | Performed by: INTERNAL MEDICINE

## 2024-07-19 PROCEDURE — 4A023N7 MEASUREMENT OF CARDIAC SAMPLING AND PRESSURE, LEFT HEART, PERCUTANEOUS APPROACH: ICD-10-PCS | Performed by: INTERNAL MEDICINE

## 2024-07-19 PROCEDURE — 99232 SBSQ HOSP IP/OBS MODERATE 35: CPT | Performed by: STUDENT IN AN ORGANIZED HEALTH CARE EDUCATION/TRAINING PROGRAM

## 2024-07-19 RX ORDER — VERAPAMIL HYDROCHLORIDE 2.5 MG/ML
INJECTION, SOLUTION INTRAVENOUS
Status: COMPLETED
Start: 2024-07-19 | End: 2024-07-19

## 2024-07-19 RX ORDER — HEPARIN SODIUM 5000 [USP'U]/ML
INJECTION, SOLUTION INTRAVENOUS; SUBCUTANEOUS
Status: COMPLETED
Start: 2024-07-19 | End: 2024-07-19

## 2024-07-19 RX ORDER — HEPARIN SODIUM 1000 [USP'U]/ML
3000 INJECTION, SOLUTION INTRAVENOUS; SUBCUTANEOUS ONCE
Status: COMPLETED | OUTPATIENT
Start: 2024-07-19 | End: 2024-07-19

## 2024-07-19 RX ORDER — MIDAZOLAM HYDROCHLORIDE 1 MG/ML
INJECTION INTRAMUSCULAR; INTRAVENOUS
Status: COMPLETED
Start: 2024-07-19 | End: 2024-07-19

## 2024-07-19 RX ORDER — NITROGLYCERIN 20 MG/100ML
INJECTION INTRAVENOUS
Status: COMPLETED
Start: 2024-07-19 | End: 2024-07-19

## 2024-07-19 RX ORDER — DIPHENHYDRAMINE HYDROCHLORIDE 50 MG/ML
INJECTION INTRAMUSCULAR; INTRAVENOUS
Status: COMPLETED
Start: 2024-07-19 | End: 2024-07-19

## 2024-07-19 RX ORDER — LIDOCAINE HYDROCHLORIDE 10 MG/ML
INJECTION, SOLUTION EPIDURAL; INFILTRATION; INTRACAUDAL; PERINEURAL
Status: COMPLETED
Start: 2024-07-19 | End: 2024-07-19

## 2024-07-19 NOTE — PLAN OF CARE
Peewee Myers Patient Status:  Observation    10/22/1995 MRN [id number]   Location [facility] 2NE-A Attending Xiomy Davila MD   Hosp Day # 0 PCP NYLA MENDIOLA       Cardiology Nocturnal APN Note    Page Received: Dr. Albert, ED Physician    HPI:     Patient is a 28 year old male with PMH of HTN, HLD, PDA, substance abuse, chronic nicotine dependence, anxiety and depression who presented to the ED with chest pain. Pt reported having intermittent episodes of chest pain for the past couple of weeks. Patient was seen at [place] ED on 2024 with same complaint and was diagnosed with lower lobe pneumonia. Pt was given prescriptions for Rocephin and azithromycin and discharged for outpatient follow up. Work up in ED showed an elevated troponin. EKG showed no acute ischemic changes. Patient was given aspirin, Zofran, Dilaudid, and IV heparin. [facility] consulted for chest pain/elevated troponin. HPI obtained from chart review and information provided by ED physician. Echocardiogram completed 2024 showed normal left ventricular cavity size, wall thickness, and systolic function. EF 55%. Normal left ventricular diastolic dysfunction. HPI obtained from chart review and information provided by ED physician.       ED Clinical Course    EKG: Normal sinus rhythm. No acute ischemic changes.     Labs: Troponin 94, , elevated LFTs    CXR: Improvement of left lower lobe retrocardiac opacity.     Medications: As noted above in HPI        Assessment/Plan:    - Troponin 94-->84. On  troponin was 97-->89. Troponin level is flat.   - Continue IV heparin for now  - Continue to monitor overnight on telemetry  - Formal cardiology consult to follow in AM.       RAIZA Carranza  [facility] [place]  2024  2:00 AM

## 2024-07-19 NOTE — PLAN OF CARE
Pt is A&O X4, on RA, SR per tele. Heparin drip per ACS protocol infusing. Pt still has an intermittent chest pain. Poc C this afternoon, per Dr Miner  Problem: Patient/Family Goals  Goal: Patient/Family Long Term Goal  Description: Patient's Long Term Goal: Go home    Interventions:  - routine lab draws  - medication compliance   - MD rounding  - See additional Care Plan goals for specific interventions  Outcome: Progressing  Goal: Patient/Family Short Term Goal  Description: Patient's Short Term Goal: pain free    Interventions:   - ice pack  -pain medication  - See additional Care Plan goals for specific interventions  Outcome: Progressing     Problem: PAIN - ADULT  Goal: Verbalizes/displays adequate comfort level or patient's stated pain goal  Description: INTERVENTIONS:  - Encourage pt to monitor pain and request assistance  - Assess pain using appropriate pain scale  - Administer analgesics based on type and severity of pain and evaluate response  - Implement non-pharmacological measures as appropriate and evaluate response  - Consider cultural and social influences on pain and pain management  - Manage/alleviate anxiety  - Utilize distraction and/or relaxation techniques  - Monitor for opioid side effects  - Notify MD/LIP if interventions unsuccessful or patient reports new pain  - Anticipate increased pain with activity and pre-medicate as appropriate  Outcome: Progressing     Problem: CARDIOVASCULAR - ADULT  Goal: Maintains optimal cardiac output and hemodynamic stability  Description: INTERVENTIONS:  - Monitor vital signs, rhythm, and trends  - Monitor for bleeding, hypotension and signs of decreased cardiac output  - Evaluate effectiveness of vasoactive medications to optimize hemodynamic stability  - Monitor arterial and/or venous puncture sites for bleeding and/or hematoma  - Assess quality of pulses, skin color and temperature  - Assess for signs of decreased coronary artery perfusion - ex. Angina  -  Evaluate fluid balance, assess for edema, trend weights  Outcome: Progressing  Goal: Absence of cardiac arrhythmias or at baseline  Description: INTERVENTIONS:  - Continuous cardiac monitoring, monitor vital signs, obtain 12 lead EKG if indicated  - Evaluate effectiveness of antiarrhythmic and heart rate control medications as ordered  - Initiate emergency measures for life threatening arrhythmias  - Monitor electrolytes and administer replacement therapy as ordered  Outcome: Progressing

## 2024-07-19 NOTE — H&P
[facility]     Peewee Myers Patient Status:  Observation    10/22/1995 MRN [id number]   Location [facility] 2NE-A Attending Xiomy Davila MD   Hosp Day # 0 PCP NYLA MENDIOLA     Chief Complaint: Chest pain     Subjective:    History of Present Illness:     Peewee Myers is a 28 year old male with  Hypertension, PDA. The patient is presenting with intermittent episodes of chest pain radiates to the neck and arm. He notes episodes over past two weeks have been more frequent and intense.     History/Other:    Past Medical History:  Past Medical History:    Anxiety    Back pain    Calculus of kidney    Cervical spondylosis    Depression    Essential hypertension    High blood pressure    Lactose intolerance    Obesity    Suicidal thoughts     Past Surgical History:   History reviewed. No pertinent surgical history.   Family History:   No family history on file.  Social History:    reports that he has been smoking cigarettes. He has a 5 pack-year smoking history. He has never used smokeless tobacco. He reports current alcohol use of about 8.0 standard drinks of alcohol per week. He reports current drug use. Frequency: 1.00 time per week. Drugs: \"Crack\" cocaine and Cannabis.     Allergies:   Allergies   Allergen Reactions    Lactose OTHER (SEE COMMENTS)     gas       Medications:    Current Facility-Administered Medications on File Prior to Encounter   Medication Dose Route Frequency Provider Last Rate Last Admin    [COMPLETED] azithromycin (Zithromax) 500 mg in sodium chloride 0.9% 250mL IVPB premix  500 mg Intravenous Once Nuzhat Choi DO   Stopped at 24 0304    [COMPLETED] cefTRIAXone (Rocephin) 2 g in sodium chloride 0.9% 100 mL IVPB-ADDV  2 g Intravenous Once Nuzhat Choi DO   Stopped at 24 0150     Current Outpatient Medications on File Prior to Encounter   Medication Sig Dispense Refill    levoFLOXacin 750 MG Oral Tab Take 1 tablet (750 mg total) by mouth  daily for 10 days. 10 tablet 0    lisinopril 30 MG Oral Tab Take 1 tablet (30 mg total) by mouth daily.         Review of Systems:   A comprehensive review of systems was completed.    Pertinent positives and negatives noted in the HPI.    Objective:   Physical Exam:    /88 (BP Location: Left arm)   Pulse 86   Temp 98.3 °F (36.8 °C) (Temporal)   Resp 21   Ht 5' 8\" (1.727 m)   Wt (!) 324 lb 8.3 oz (147.2 kg)   SpO2 96%   BMI 49.34 kg/m²   General: No acute distress, Alert  Respiratory: No rhonchi, no wheezes  Cardiovascular: S1, S2. Regular rate and rhythm  Abdomen: Soft, Non-tender, non-distended, positive bowel sounds  Neuro: No new focal deficits  Extremities: No edema      Results:    Labs:      Labs Last 24 Hours:    Recent Labs   Lab 07/16/24 2333 07/18/24  1824   RBC 4.82 4.97   HGB 14.6 14.9   HCT 42.0 42.8   MCV 87.1 86.1   MCH 30.3 30.0   MCHC 34.8 34.8   RDW 12.9 12.9   NEPRELIM 5.77 6.38   WBC 10.0 9.9   .0 305.0       Recent Labs   Lab 07/16/24 2333 07/18/24  1824   * 87   BUN 8* 9   CREATSERUM 0.71 0.74   EGFRCR 127 125   CA 9.4 9.7   ALB 4.5 4.6    135*   K 3.8 3.9    106   CO2 28.0 25.0   ALKPHO 110 137*   AST 42* 48*   ALT 72* 82*   BILT 0.5 0.5   TP 7.4 7.8       Lab Results   Component Value Date    INR 0.95 07/18/2024       Recent Labs   Lab 07/16/24 2333 07/17/24  0127 07/18/24  1824   TROPHS 97* 89* 94*       No results for input(s): \"TROP\", \"PBNP\" in the last 168 hours.    No results for input(s): \"PCT\" in the last 168 hours.    Imaging: Imaging data reviewed in Epic.    Assessment & Plan:      #Chest pain, concerning for unstable angina  Trend Trop  Hep drip  Echo done 1/24  Cards on CS  #Hypertension  Continue home meds  Lisinopril 30  #Obesity   # nicotine use  Down to 5 cig/ day  # previous cocaine use  Pt reports stopping months ago   #Recent diganosis of PNA   Continue  Abx        Plan of care discussed with pt    Xiomy Davila MD    Supplementary  Documentation:     The 21st Century Cures Act makes medical notes like these available to patients in the interest of transparency. Please be advised this is a medical document. Medical documents are intended to carry relevant information, facts as evident, and the clinical opinion of the practitioner. The medical note is intended as peer to peer communication and may appear blunt or direct. It is written in medical language and may contain abbreviations or verbiage that are unfamiliar.

## 2024-07-19 NOTE — PROGRESS NOTES
[facility]   part of [facility]     Hospitalist Progress Note     Peewee Myers Patient Status:  Observation    10/22/1995 MRN [id number]   Location [facility] 2NE-A Attending Corby, Olu Oropeza, *   Hosp Day # 0 PCP NYLA MENDIOLA     Chief Complaint: Chest pain     Subjective:      - Pt denies any current chest pain now, has had extensive evaluation for chest pain in the past, has hx 1-2ppd smoking hx though now cut back to 1 pack every 5 days    - Denies other f/c, cough, congestion, abd pain, n/v    Objective:    Review of Systems:   A comprehensive review of systems was completed; pertinent positive and negatives stated in subjective.    Vital signs:  Temp:  [97.8 °F (36.6 °C)-98.3 °F (36.8 °C)] 97.8 °F (36.6 °C)  Pulse:  [72-94] 74  Resp:  [16-26] 26  BP: (129-140)/(65-88) 130/71  SpO2:  [96 %-99 %] 98 %    Physical Exam:    General: No acute distress  Respiratory: no wheezes, no rhonchi  Cardiovascular: S1, S2, regular rate and rhythm  Abdomen: Soft, Non-tender, non-distended, positive bowel sounds  Neuro: No new focal deficits.   Extremities: no edema    Diagnostic Data:    Labs:  Recent Labs   Lab 24  0636   WBC 10.0 9.9 7.6   HGB 14.6 14.9 14.0   MCV 87.1 86.1 87.4   .0 305.0 282.0   INR  --  0.95 0.99       Recent Labs   Lab 24  1824   * 87   BUN 8* 9   CREATSERUM 0.71 0.74   CA 9.4 9.7   ALB 4.5 4.6    135*   K 3.8 3.9    106   CO2 28.0 25.0   ALKPHO 110 137*   AST 42* 48*   ALT 72* 82*   BILT 0.5 0.5   TP 7.4 7.8       Estimated Creatinine Clearance: 141.2 mL/min (based on SCr of 0.74 mg/dL).    Recent Labs   Lab 24  01224 24  0030   TROPHS 89* 94* 84*       Recent Labs   Lab 24  1824 24  0636   PTP 12.7 13.1   INR 0.95 0.99                  Microbiology    No results found for this visit on 24.      Imaging: Reviewed in Epic.    Medications:    heparin   3,000 Units Intravenous Once    lisinopril  30 mg Oral Daily    levoFLOXacin  750 mg Oral Daily    aspirin  325 mg Oral Daily       Assessment & Plan:      # Chest pain, possibly 2/2 known PNA   - Trops flat   - CXR improved LLL opacity, admission EKG personally reviewed -- showing NSR, no ST elevations/depressions   - Heparin ggt    - Cardiology on consult    - Recent CTA FFR with normal flow on 1/12/2024; cardiology planning for [facility] tmr    - Monitor on telemetry     # Hypertension - Continue home oral antihypertensives  # Morbid obesity, BMI 49  # Nicotine use   # Hx Cocaine use  # Recent PNA - IV abx continued       Olu Tavarez MD    Supplementary Documentation:     Quality:  DVT Mechanical Prophylaxis:   SCDs,    DVT Pharmacologic Prophylaxis   Medication    heparin (Porcine) 1000 UNIT/ML injection 3,000 Units    heparin (Porcine) 71255 units/250mL infusion ACS/AFIB CONTINUOUS    DVT Pharmacologic prophylaxis: Aspirin 325 mg           Code Status: Not on file  Craig: No urinary catheter in place  Craig Duration (in days):   Central line:    MARITZA:     The 21st Century Cures Act makes medical notes like these available to patients in the interest of transparency. Please be advised this is a medical document. Medical documents are intended to carry relevant information, facts as evident, and the clinical opinion of the practitioner. The medical note is intended as peer to peer communication and may appear blunt or direct. It is written in medical language and may contain abbreviations or verbiage that are unfamiliar.

## 2024-07-19 NOTE — ED QUICK NOTES
Orders for admission, patient is aware of plan and ready to go upstairs. Any questions, please call ED RN Yashira at extension [phone].     Patient Covid vaccination status: Fully vaccinated     COVID Test Ordered in ED: None    COVID Suspicion at Admission: N/A    Running Infusions:    [START ON 7/19/2024] continuous dose heparin      1000u/hr     Mental Status/LOC at time of transport: A&Ox4    Other pertinent information:   CIWA score: N/A   NIH score:  N/A

## 2024-07-19 NOTE — PLAN OF CARE
Assumed care of patient at 23:00 from the ED, alert and oriented x4,   Sinus rhythm on tele, heparin gtt maintained.  Room air, lung sounds clear bilaterally, no cough noted. Continuous pulse oximetry maintained. Denies shortness of breath and dizziness.   Complaints of moderate chest pain. PRN pain med given.  Continent of bowel and bladder.   Glasses present at bedside.  Safety precautions maintained    Discussed plan of care with patient. All questions answered.    Problem: Patient/Family Goals  Goal: Patient/Family Long Term Goal  Description: Patient's Long Term Goal: Go home    Interventions:  - routine lab draws  - medication compliance   - MD rounding  - See additional Care Plan goals for specific interventions  Outcome: Progressing  Goal: Patient/Family Short Term Goal  Description: Patient's Short Term Goal: pain free    Interventions:   - ice pack  -pain medication  - See additional Care Plan goals for specific interventions  Outcome: Progressing     Problem: PAIN - ADULT  Goal: Verbalizes/displays adequate comfort level or patient's stated pain goal  Description: INTERVENTIONS:  - Encourage pt to monitor pain and request assistance  - Assess pain using appropriate pain scale  - Administer analgesics based on type and severity of pain and evaluate response  - Implement non-pharmacological measures as appropriate and evaluate response  - Consider cultural and social influences on pain and pain management  - Manage/alleviate anxiety  - Utilize distraction and/or relaxation techniques  - Monitor for opioid side effects  - Notify MD/LIP if interventions unsuccessful or patient reports new pain  - Anticipate increased pain with activity and pre-medicate as appropriate  Outcome: Progressing     Problem: CARDIOVASCULAR - ADULT  Goal: Maintains optimal cardiac output and hemodynamic stability  Description: INTERVENTIONS:  - Monitor vital signs, rhythm, and trends  - Monitor for bleeding, hypotension and signs of  decreased cardiac output  - Evaluate effectiveness of vasoactive medications to optimize hemodynamic stability  - Monitor arterial and/or venous puncture sites for bleeding and/or hematoma  - Assess quality of pulses, skin color and temperature  - Assess for signs of decreased coronary artery perfusion - ex. Angina  - Evaluate fluid balance, assess for edema, trend weights  Outcome: Progressing  Goal: Absence of cardiac arrhythmias or at baseline  Description: INTERVENTIONS:  - Continuous cardiac monitoring, monitor vital signs, obtain 12 lead EKG if indicated  - Evaluate effectiveness of antiarrhythmic and heart rate control medications as ordered  - Initiate emergency measures for life threatening arrhythmias  - Monitor electrolytes and administer replacement therapy as ordered  Outcome: Progressing

## 2024-07-19 NOTE — ED PROVIDER NOTES
Patient Seen in: [facility] Emergency Department      History     Chief Complaint   Patient presents with    Chest Pain Angina     Stated Complaint: chest pain- was seen tuesday this week and told he has pneumonia and feels it's*    Subjective:   HPI    28-year-old male who presents to the emergency department complaint having chest pain.  Reviewing the patient's records he was seen here on 7/17/2024 at that time diagnosed with right lower lobar pneumonia.  At that time it was noted that he had elevated troponins and had 2 sets of troponins performed.  The initial troponin was 97 and the second troponin was 89.  He was sent home but says he had continued discomfort that is worsening in his chest with less and less exertion.  He said he felt dizzy and lightheaded.  He denies any drug use.  Denies any alcohol use.  Says he is cutting back on smoking.  He does have a history of hypertension and he is significantly overweight.    Objective:   Past Medical History:    Anxiety    Back pain    Calculus of kidney    Cervical spondylosis    Depression    Essential hypertension    High blood pressure    Lactose intolerance    Obesity    Suicidal thoughts              History reviewed. No pertinent surgical history.             Social History     Socioeconomic History    Marital status:    Tobacco Use    Smoking status: Every Day     Current packs/day: 0.50     Average packs/day: 0.5 packs/day for 10.0 years (5.0 ttl pk-yrs)     Types: Cigarettes    Smokeless tobacco: Never   Vaping Use    Vaping status: Former    Substances: THC, flour/ediple   Substance and Sexual Activity    Alcohol use: Yes     Alcohol/week: 8.0 standard drinks of alcohol     Types: 5 Cans of beer, 3 Shots of liquor per week     Comment: quarter of adonis a weekend    Drug use: Yes     Frequency: 1.0 times per week     Types: \"Crack\" cocaine, Cannabis     Social Determinants of Health     Financial Resource Strain: Not on File (10/5/2022)     Received from [facility]    Financial Resource Strain     Financial Resource Strain: 0   Food Insecurity: No Food Insecurity (1/11/2024)    Food Insecurity     Food Insecurity: Never true   Transportation Needs: No Transportation Needs (1/11/2024)    Transportation Needs     Lack of Transportation: No   Physical Activity: Not on File (10/5/2022)    Received from [facility]    Physical Activity     Physical Activity: 0   Stress: Not on File (10/5/2022)    Received from [facility]    Stress     Stress: 0   Social Connections: Not on File (10/5/2022)    Received from [facility]    Social Connections     Social Connections and Isolation: 0   Housing Stability: Low Risk  (1/11/2024)    Housing Stability     Housing Instability: No              Review of Systems    Positive for stated Chief Complaint: Chest Pain Angina    Other systems are as noted in HPI.  Constitutional and vital signs reviewed.      All other systems reviewed and negative except as noted above.    Physical Exam     ED Triage Vitals [07/18/24 1807]   /81   Pulse 94   Resp 19   Temp 98.3 °F (36.8 °C)   Temp src Temporal   SpO2 97 %   O2 Device None (Room air)       Current Vitals:   Vital Signs  BP: 140/76  Pulse: 84  Resp: 16  Temp: 98.3 °F (36.8 °C)  Temp src: Temporal  MAP (mmHg): 95    Oxygen Therapy  SpO2: 97 %  O2 Device: None (Room air)            Physical Exam  General: This a pleasant nontoxic appearing patient in no apparent distress alert and oriented ×3  HEENT: Pupils are equal reactive to light.  Extra ocular motions are intact.  No scleral icterus or conjunctival pallor: Neck is supple without tenderness on palpation.  Head is atraumatic normocephalic.  Oral mucosa moist.  Tongue is midline.  No posterior pharyngeal lesions.    Lungs: Clear to auscultation bilaterally.  No wheezes, rhonchi, or rales appreciated.  No accessory muscle use noted for breathing.  Cardiac: Regular rate and rhythm.  Normal S1 and 2 without murmurs or  ectopy appreciated  Abdomen: Soft on examination without tenderness to deep palpation or to percussion.  No masses appreciated.  Bowel sounds are normoactive.  No CVA tenderness.  Extremities: No cyanosis, no edema or clubbing.  Pulses are +2.  Full range of motion is noted of the extremities without deformities.  No tenderness.  Neurologically intact.       ED Course     Labs Reviewed   COMP METABOLIC PANEL (14) - Abnormal; Notable for the following components:       Result Value    Sodium 135 (*)     AST 48 (*)     ALT 82 (*)     Alkaline Phosphatase 137 (*)     All other components within normal limits   TROPONIN I HIGH SENSITIVITY - Abnormal; Notable for the following components:    Troponin I (High Sensitivity) 94 (*)     All other components within normal limits   PROTHROMBIN TIME (PT) - Normal   PTT, ACTIVATED - Normal   CBC WITH DIFFERENTIAL WITH PLATELET    Narrative:     The following orders were created for panel order CBC With Differential With Platelet.  Procedure                               Abnormality         Status                     ---------                               -----------         ------                     CBC W/ DIFFERENTIAL[[id number]]                              Final result                 Please view results for these tests on the individual orders.   RAINBOW DRAW LAVENDER   RAINBOW DRAW LIGHT GREEN   RAINBOW DRAW BLUE   CBC W/ DIFFERENTIAL     EKG    Rate, intervals and axes as noted on EKG Report.  Rate: 97  Rhythm: Sinus Rhythm  Reading: Sinus rhythm with LVH voltage criteria nonspecific ST-T wave changes         Narrative  PROCEDURE:  XR CHEST AP PORTABLE  (CPT=71045)     TECHNIQUE:  AP chest radiograph was obtained.     COMPARISON:  [facility], CT, CT CARDIAC OVER READ, 1/12/2024, 12:09 PM.  [facility] , XR, XR CHEST AP PORTABLE  (CPT=71045), 7/16/2024, 11:42 PM.     INDICATIONS:  chest pain- was seen tuesday this week and told he has pneumonia and feels it's gotten worse      PATIENT STATED HISTORY: (As transcribed by Technologist)  Patient offered no additional history at this time.         FINDINGS:  This is a lordotic view. Cardiomegaly.  No new focal consolidation, pleural effusion or pneumothorax.                    Impression  CONCLUSION:    1. When compared to 7/16/2024 chest radiograph, there is improvement of left lower lobe retrocardiac opacity.  No new focal consolidation appreciated.        LOCATION:  [facility]                 Dictated by (CST): Ewa Christy MD on 7/18/2024 at 7:31 PM      Finalized by (CST): Ewa Christy MD on 7/18/2024 at 7:33 PM                   MDM    Differential diagnosis includes but is not limited to cardiac ischemia, pneumothorax, worsening pneumonia, congestive heart failure, esophageal rupture.  I reviewed the x-rays and agree with the radiologist report that showed compared to the x-ray of 7/16/2024 chest radiograph there is improvement of the left lower lobe retrocardiac opacity.  No new focal consolidation appreciated.  CBC was normal.  Sodium 135 AST 48 ALT of 82 alk phos 137.  Troponin was back up to 94.  Because of the patient's continued chest pain and reelevation of troponin he will be heparinized he will be hospitalized with cardiac consultation.  He received aspirin.  I spoke to the hospital service.  Patient is admitted to the hospital for continued care.  Admission disposition: 7/18/2024  8:32 PM                                        Medical Decision Making      Disposition and Plan     Clinical Impression:  1. Elevated troponin    2. Acute chest pain         Disposition:  Admit  7/18/2024  8:32 pm    Follow-up:  No follow-up provider specified.        Medications Prescribed:  Current Discharge Medication List                            Hospital Problems       Present on Admission             ICD-10-CM Noted POA    * (Principal) Elevated troponin R79.89 7/18/2024 Unknown    Hyponatremia E87.1 7/18/2024 Yes

## 2024-07-19 NOTE — CM/SW NOTE
07/19/24 1600   CM/SW Referral Data   Referral Source Social Work (self-referral)   Reason for Referral PHQ4/PHQ9   Informant EMR;Clinical Staff Member;Patient   Medical Hx   Does patient have an established PCP? Yes   Patient Info   Patient's Current Mental Status at Time of Assessment Alert;Oriented   Patient's Home Environment House   Number of Levels in Home 1   Patient lives with Sibling  (sister's family)   Patient Status Prior to Admission   Independent with ADLs and Mobility Yes   Discharge Needs   Anticipated D/C needs To be determined     Patient is a 27 y/o male admitted due to elevated troponin. SW noted and acknowledged consult order for PHQ4 concerns.     SW met with pt at bedside to discuss discharge planning and PHQ4 concerns. Pt reports he lives with his sister and her family in a 1 level house. Pt denied owning any DME and reports he is independent with ADLs. Pt stated he is employed full time.    SW discussed PHQ concerns with anxiety and lack of interest in things. 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 SW inquired about his lack of interest in things. Pt stated that he often work very long shifts so all he does is go to work and go back home, so pt stated he doesn't have the energy to do anything else. SW inquired if pt has ever had any experience with therapy or taken medication to address his anxiety. Pt stated he had previously been on medication for anxiety/depression for about a year and was also seeing a psychiatrist during that year. Pt stated he stopped the medication and seeing his psychiatrist in February and has not resumed either since then. SW inquired about additional resources/support for pt. Pt denied having any concerns with his mental health currently and denied any resources. Pt stated his job is wanting him to take more time of so he does not over work himself, so  pt will arrange to have some time off when discharged.    LEIGHA informed pt that she will continue to monitor for any anticipated DC needs. Pt thanked LEIGHA and denied having any further questions at this time.     to remain available for support and/or discharge planning.    MALINDA Goodwin  Discharge Planner  [phone]

## 2024-07-20 VITALS
WEIGHT: 315 LBS | DIASTOLIC BLOOD PRESSURE: 72 MMHG | HEART RATE: 83 BPM | SYSTOLIC BLOOD PRESSURE: 118 MMHG | RESPIRATION RATE: 18 BRPM | BODY MASS INDEX: 47.74 KG/M2 | TEMPERATURE: 99 F | HEIGHT: 68 IN | OXYGEN SATURATION: 97 %

## 2024-07-20 PROCEDURE — 99239 HOSP IP/OBS DSCHRG MGMT >30: CPT | Performed by: STUDENT IN AN ORGANIZED HEALTH CARE EDUCATION/TRAINING PROGRAM

## 2024-07-20 RX ORDER — ATORVASTATIN CALCIUM 10 MG/1
10 TABLET, FILM COATED ORAL NIGHTLY
Qty: 90 TABLET | Refills: 0 | Status: SHIPPED | OUTPATIENT
Start: 2024-07-20

## 2024-07-20 RX ORDER — ATORVASTATIN CALCIUM 10 MG/1
10 TABLET, FILM COATED ORAL NIGHTLY
Status: DISCONTINUED | OUTPATIENT
Start: 2024-07-20 | End: 2024-07-20

## 2024-07-20 RX ORDER — ASPIRIN 325 MG
325 TABLET ORAL DAILY
Qty: 90 TABLET | Refills: 0 | Status: SHIPPED | OUTPATIENT
Start: 2024-07-20

## 2024-07-20 NOTE — PROGRESS NOTES
[facility]   part of [facility]     Hospitalist Progress Note     Peewee Myers Patient Status:  Observation    10/22/1995 MRN [id number]   Location [facility] 2NE-A Attending [place], Olu Oropeza, *   Hosp Day # 0 PCP NYLA MENDIOLA     Chief Complaint: Chest pain     Subjective:      - s/p LHC yesterday with minimal CAD, started on statin   - Denies other acute complaints, right hand/wrist feeling well    Objective:    Review of Systems:   A comprehensive review of systems was completed; pertinent positive and negatives stated in subjective.    Vital signs:  Temp:  [97.3 °F (36.3 °C)-98.7 °F (37.1 °C)] 97.3 °F (36.3 °C)  Pulse:  [76-97] 76  Resp:  [18-20] 18  BP: (109-152)/(58-88) 152/88  SpO2:  [93 %-98 %] 97 %    Physical Exam:    General: No acute distress  Respiratory: no wheezes, no rhonchi  Cardiovascular: S1, S2, regular rate and rhythm  Abdomen: Soft, Non-tender, non-distended, positive bowel sounds  Neuro: No new focal deficits.   Extremities: no edema    Diagnostic Data:    Labs:  Recent Labs   Lab 24  1824 24  0636   WBC 10.0 9.9 7.6   HGB 14.6 14.9 14.0   MCV 87.1 86.1 87.4   .0 305.0 282.0   INR  --  0.95 0.99       Recent Labs   Lab 24  18224  0636   * 87 93   BUN 8* 9 9   CREATSERUM 0.71 0.74 0.75   CA 9.4 9.7 9.4   ALB 4.5 4.6  --     135* 135*   K 3.8 3.9 3.9    106 105   CO2 28.0 25.0 26.0   ALKPHO 110 137*  --    AST 42* 48*  --    ALT 72* 82*  --    BILT 0.5 0.5  --    TP 7.4 7.8  --        Estimated Creatinine Clearance: 139.3 mL/min (based on SCr of 0.75 mg/dL).    Recent Labs   Lab 24  1824 24  0030 24  0636   TROPHS 94* 84* 89*       Recent Labs   Lab 24  1824 24  0636   PTP 12.7 13.1   INR 0.95 0.99                  Microbiology    No results found for this visit on 24.      Imaging: Reviewed in Epic.    Medications:    atorvastatin  10 mg Oral Nightly     lisinopril  30 mg Oral Daily    levoFLOXacin  750 mg Oral Daily    aspirin  325 mg Oral Daily       Assessment & Plan:      # Chest pain, possibly 2/2 known PNA   - Trops flat   - CXR improved LLL opacity, admission EKG personally reviewed -- showing NSR, no ST elevations/depressions   - Heparin ggt dc   - Cardiology on consult    - Recent CTA FFR with normal flow on 1/12/2024;   - s/p LHC yesterday with minimal CAD   - Monitor on telemetry     # Hypertension - Continue home oral antihypertensives  # Morbid obesity, BMI 49  # Nicotine use   # Hx Cocaine use  # Recent PNA - IV abx continued     DC home, resume PO abx for PNA, f/u with PCP     Olu Tavarez MD    Supplementary Documentation:     Quality:  DVT Mechanical Prophylaxis:   SCDs,    DVT Pharmacologic Prophylaxis   Medication    heparin (Porcine) 33397 units/250mL infusion ACS/AFIB CONTINUOUS    DVT Pharmacologic prophylaxis: Aspirin 325 mg           Code Status: Not on file  Craig: No urinary catheter in place  Craig Duration (in days):   Central line:    MARITZA: 7/20/2024    The 21st Century Cures Act makes medical notes like these available to patients in the interest of transparency. Please be advised this is a medical document. Medical documents are intended to carry relevant information, facts as evident, and the clinical opinion of the practitioner. The medical note is intended as peer to peer communication and may appear blunt or direct. It is written in medical language and may contain abbreviations or verbiage that are unfamiliar.

## 2024-07-20 NOTE — PLAN OF CARE
Pt discharged home. IV removed, tele dc'd and returned to monitor tech. F/U instructions provided and discussed. Pt verbalized understanding. Rx given. Discussed adverse reactions and side effects of all new medications and provided appropriate handouts. Pt voiced understanding. Pt wheeled down by staff with all belongings. Pt left denying c/o pain, malaise, or cardiac symptoms. All needs met by staff.

## 2024-07-20 NOTE — PLAN OF CARE
Pt denies c/o pain, malaise or cardiac symptoms. A&Ox4. Lungs clear bilaterally with equal expansion, on room air. Pt NSR on monitor with regular rate. Abdomen soft and non-tender with active bowel sounds in all four quadrants. Continent of B&B. Incision to right wrist, clean dry intact, soft with no hematoma, bandaid. Pt updated with plan of care.    Problem: Patient/Family Goals  Goal: Patient/Family Long Term Goal  Description: Patient's Long Term Goal: Go home    Interventions:  - routine lab draws  - medication compliance   - MD rounding  - See additional Care Plan goals for specific interventions  Outcome: Progressing  Goal: Patient/Family Short Term Goal  Description: Patient's Short Term Goal: pain free    Interventions:   - ice pack  -pain medication  - See additional Care Plan goals for specific interventions  Outcome: Progressing     Problem: PAIN - ADULT  Goal: Verbalizes/displays adequate comfort level or patient's stated pain goal  Description: INTERVENTIONS:  - Encourage pt to monitor pain and request assistance  - Assess pain using appropriate pain scale  - Administer analgesics based on type and severity of pain and evaluate response  - Implement non-pharmacological measures as appropriate and evaluate response  - Consider cultural and social influences on pain and pain management  - Manage/alleviate anxiety  - Utilize distraction and/or relaxation techniques  - Monitor for opioid side effects  - Notify MD/LIP if interventions unsuccessful or patient reports new pain  - Anticipate increased pain with activity and pre-medicate as appropriate  Outcome: Progressing     Problem: CARDIOVASCULAR - ADULT  Goal: Maintains optimal cardiac output and hemodynamic stability  Description: INTERVENTIONS:  - Monitor vital signs, rhythm, and trends  - Monitor for bleeding, hypotension and signs of decreased cardiac output  - Evaluate effectiveness of vasoactive medications to optimize hemodynamic stability  -  Monitor arterial and/or venous puncture sites for bleeding and/or hematoma  - Assess quality of pulses, skin color and temperature  - Assess for signs of decreased coronary artery perfusion - ex. Angina  - Evaluate fluid balance, assess for edema, trend weights  Outcome: Progressing  Goal: Absence of cardiac arrhythmias or at baseline  Description: INTERVENTIONS:  - Continuous cardiac monitoring, monitor vital signs, obtain 12 lead EKG if indicated  - Evaluate effectiveness of antiarrhythmic and heart rate control medications as ordered  - Initiate emergency measures for life threatening arrhythmias  - Monitor electrolytes and administer replacement therapy as ordered  Outcome: Progressing

## 2024-07-20 NOTE — PLAN OF CARE
Patient is alert & oriented x4. Ambulates well independently. Breath sounds are clear bilateral. On room air. Normal sinus rhythm. Mild pain reported at incisional site, ice pack placed w/ relief. Right wrist TR band removed w/ no complications, soft, no hematoma, and pulse present, bandaid placed. Pt able to void. Bed in low position and call light within reach. Reviewed plan of care and patient verbalizes understanding.        Problem: Patient/Family Goals  Goal: Patient/Family Long Term Goal  Description: Patient's Long Term Goal: Go home    Interventions:  - routine lab draws  - medication compliance   - MD rounding  - See additional Care Plan goals for specific interventions  Outcome: Progressing  Goal: Patient/Family Short Term Goal  Description: Patient's Short Term Goal: pain free    Interventions:   - ice pack  -pain medication  - See additional Care Plan goals for specific interventions  Outcome: Progressing     Problem: PAIN - ADULT  Goal: Verbalizes/displays adequate comfort level or patient's stated pain goal  Description: INTERVENTIONS:  - Encourage pt to monitor pain and request assistance  - Assess pain using appropriate pain scale  - Administer analgesics based on type and severity of pain and evaluate response  - Implement non-pharmacological measures as appropriate and evaluate response  - Consider cultural and social influences on pain and pain management  - Manage/alleviate anxiety  - Utilize distraction and/or relaxation techniques  - Monitor for opioid side effects  - Notify MD/LIP if interventions unsuccessful or patient reports new pain  - Anticipate increased pain with activity and pre-medicate as appropriate  Outcome: Progressing     Problem: CARDIOVASCULAR - ADULT  Goal: Maintains optimal cardiac output and hemodynamic stability  Description: INTERVENTIONS:  - Monitor vital signs, rhythm, and trends  - Monitor for bleeding, hypotension and signs of decreased cardiac output  - Evaluate  effectiveness of vasoactive medications to optimize hemodynamic stability  - Monitor arterial and/or venous puncture sites for bleeding and/or hematoma  - Assess quality of pulses, skin color and temperature  - Assess for signs of decreased coronary artery perfusion - ex. Angina  - Evaluate fluid balance, assess for edema, trend weights  Outcome: Progressing  Goal: Absence of cardiac arrhythmias or at baseline  Description: INTERVENTIONS:  - Continuous cardiac monitoring, monitor vital signs, obtain 12 lead EKG if indicated  - Evaluate effectiveness of antiarrhythmic and heart rate control medications as ordered  - Initiate emergency measures for life threatening arrhythmias  - Monitor electrolytes and administer replacement therapy as ordered  Outcome: Progressing

## 2024-07-20 NOTE — PROGRESS NOTES
[facility] Cardiology  Report of Consultation    Peewee Myers Patient Status:  Observation    10/22/1995 MRN [id number]   [facility] 2NE-A Attending Olu Tavarez, *   Hosp Day # 0 PCP NYLA MENDIOLA     Subjective:   Feeling well this AM. No CV complaints.     Reason for Consultation:   CP, elevated troponin, CAD    History of Present Illness:   Peewee Myers is a(n) 28 year old male with prior CAD work-up with CTA suggesting non-obstructive CAD  who presents with worsening CP over the last 1-2 weeks.      In January he had CP that resulted in CTA demonstrating zero coronary calcium but was read as having <25% LAD disease and then 50-75% PDA disease.    He has had CP recently which he refers to as \"squeezing\" in his center chest.  It can come at rest or which exertion.  It tends to worse with activity, however, and then improve with rest.  Recently he has had associated JENNINGS.  He has also had some lightheadedness but it has not consistently been associated with the CP.  He has not taken NTG or analgesics.  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  He was initially diagnosed with PNA.  Of note he had no cough, no fevers/chills, no prior respiratory illnesses.  Symptoms continued and he went back to the ER and this time was admitted after an elevated troponin was noted.    To me he denies any typical PNA symptoms.  He notes he continues to have some mild CP off and on, even at rest sometimes.      He notes he has quit doing cocaine and nearly quit smoking and drinking.  He is concerned he may have \"done damage\" from his prior substance use.  But he is insistent that he is moving forward towards a more healthy lifestyle.    Past Medical History:   Past Medical History:    Anxiety    Back pain    Back problem    Calculus of kidney    Cervical spondylosis    Depression    Essential hypertension    High blood  pressure    Lactose intolerance    Obesity    Suicidal thoughts    Visual impairment       Social History:   Smoking:  None  Alcohol:  None    Family History:   No family history of premature arthrosclerotic heart disease     Medications:   Scheduled:    lisinopril  30 mg Oral Daily    levoFLOXacin  750 mg Oral Daily    aspirin  325 mg Oral Daily       Continuous Infusion:    continuous dose heparin Stopped (07/19/24 1645)       PRN Medications:     acetaminophen    ondansetron    prochlorperazine    polyethylene glycol (PEG 3350)    sennosides    bisacodyl    fleet enema    benzonatate    glycerin-hypromellose-    sodium chloride    nitroglycerin    Outpatient Medications:   Current Facility-Administered Medications on File Prior to Encounter   Medication Dose Route Frequency Provider Last Rate Last Admin    [COMPLETED] azithromycin (Zithromax) 500 mg in sodium chloride 0.9% 250mL IVPB premix  500 mg Intravenous Once Nuzhat Choi DO   Stopped at 07/17/24 0304    [COMPLETED] cefTRIAXone (Rocephin) 2 g in sodium chloride 0.9% 100 mL IVPB-ADDV  2 g Intravenous Once Nuzhat Choi DO   Stopped at 07/17/24 0150     Current Outpatient Medications on File Prior to Encounter   Medication Sig Dispense Refill    levoFLOXacin 750 MG Oral Tab Take 1 tablet (750 mg total) by mouth daily for 10 days. 10 tablet 0    lisinopril 30 MG Oral Tab Take 1 tablet (30 mg total) by mouth daily.         Allergies:   Allergies   Allergen Reactions    Lactose OTHER (SEE COMMENTS)     gas       Review of Systems:   No fevers, chills, change in weight or bowel habits.  Ten point review of systems is otherwise negative or unremarkable.    Physical Exam:   Vitals:    07/20/24 0445   BP: 152/88   Pulse: 76   Resp: 18   Temp: 97.3 °F (36.3 °C)     Wt Readings from Last 3 Encounters:   07/18/24 (!) 324 lb 8.3 oz (147.2 kg)   07/16/24 (!) 323 lb (146.5 kg)   01/11/24 (!) 316 lb 2.2 oz (143.4 kg)           General: Well developed, well  nourished male.  Pt is in no acute distress.  HEENT:   Normocephalic.  Atraumatic.  Eyes with no scleral icterus.  Neck: Supple.  No JVD.  Carotids 2+ and equal in symmetric fashion.  No bruits are noted.  Cardiac: Regular rate and rhythm.   There is a normal S1 and S2.  No S3 or S4.  No murmurs, rubs, or gallops.  PMI is non-displaced with a normal apical impulse.  Lungs: Clear to ascultation bilaterally.  No focal rales, rhonchi, or wheezes.  Good air movement is noted throughout all lung fields.  Abdomen: Soft.  Non-distended.  Non-tender.  Bowel sounds are present and normoactive.  No guarding or rebound.   Extremities: Extremities do not demonstrate any evidence of peripheral edema.   No cyanosis or clubbing of the digits is appreciated.  Femoral, Dorsalis Pedis, and Posterior Tibialis  pulses are 2+ and equal in a symmetric fashion.  Neurologic: Alert and oriented, normal affect.  No gross deficit appreciated.  Integument:  No visible rashes are appreciated.      Laboratories and Data:   Labs:    Recent Labs   Lab 07/16/24 2333 07/18/24 1824 07/19/24  0636   * 87 93   BUN 8* 9 9   CREATSERUM 0.71 0.74 0.75   CA 9.4 9.7 9.4   ALB 4.5 4.6  --     135* 135*   K 3.8 3.9 3.9    106 105   CO2 28.0 25.0 26.0   ALKPHO 110 137*  --    AST 42* 48*  --    ALT 72* 82*  --    BILT 0.5 0.5  --    TP 7.4 7.8  --        Recent Labs   Lab 07/16/24 2333 07/18/24 1824 07/19/24  0636   RBC 4.82 4.97 4.69   HGB 14.6 14.9 14.0   HCT 42.0 42.8 41.0   MCV 87.1 86.1 87.4   MCH 30.3 30.0 29.9   MCHC 34.8 34.8 34.1   RDW 12.9 12.9 12.8   NEPRELIM 5.77 6.38 4.27   WBC 10.0 9.9 7.6   .0 305.0 282.0       Recent Labs   Lab 07/18/24  1824 07/19/24  0636   PTP 12.7 13.1   INR 0.95 0.99       No results for input(s): \"TROP\", \"CK\" in the last 168 hours.    Diagnostics:   Tele: Sinus.  EKG: Sinus.  LVH.  Echo:   1/24  Conclusions:   Left ventricle: The cavity size was normal. Wall thickness was normal.   Systolic  function was normal. The estimated ejection fraction was 55%. Left   ventricular diastolic function parameters were normal for the patient's age.   CTA:   CONCLUSION:   1. There is possibly obstructive (50-74% stenosis) coronary artery disease involving the distal PDA..  2. There is nonobstructive (<25% stenosis) coronary artery disease involving the mid RCA..  3. The visualized thoracic aorta is normal in size.  4. See the radiologist report for over-read of non-vascular structures.  CXR:  CONCLUSION:  Retrocardiac density with possible air bronchograms could represent left lower lobe pneumonia.          Assessment:  1. Chest pain  2. CAD by CTA  3. Hx cocaine use  4. Mild troponin elevation  5. Possible PNA, LLL  6. HTN      Plan:  1. CP: Non-cardiac. Minimal CAD by LHC  2. CAD: Minimal by LHC. LDL 91- start statin to target LDL <70  3. PNA: Per primary team.  4. HTN: ACEI, acceptable.    Stable for discharge from CV standpoint. Cardiology will sign off for now. Don't hesitate to reach out with any questions/concerns    Risks/benefits discussed at length and he wishes to proceed.    Pam Stearns, APRN  7/20/2024  6:40 AM

## 2024-07-22 LAB
ATRIAL RATE: 96 BPM
P AXIS: 71 DEGREES
P-R INTERVAL: 156 MS
Q-T INTERVAL: 382 MS
QRS DURATION: 90 MS
QTC CALCULATION (BEZET): 482 MS
R AXIS: 60 DEGREES
T AXIS: 58 DEGREES
VENTRICULAR RATE: 96 BPM

## 2024-07-24 NOTE — DISCHARGE SUMMARY
[facility]     Peewee Myers Patient Status:  Observation    10/22/1995 MRN [id number]   Location [facility] 2NE-A Attending No att. providers found   Hosp Day # 0 PCP NYLA MENDIOLA     Date of Admission: 2024  Date of Discharge: 2024  Discharge Disposition: Home or Self Care    Admitting Diagnosis:   Elevated troponin [R79.89]  Acute chest pain [R07.9]    Hospital Discharge Diagnoses:   Chest pain, possibly secondary to known pneumonia  Hypertension  Morbid obesity, BMI 49  nicotine use   history of cocaine use  Recent pneumonia    Lace+ Score: 22  59-90 High Risk  29-58 Medium Risk  0-28   Low Risk.    TCM Follow-Up Recommendation:  LACE < 29: Low Risk of readmission after discharge from the hospital; Still recommend for TCM follow-up.        Discharge Diagnosis:   Chest pain, possibly secondary to known pneumonia    History of Present Illness: Peewee Myers is a 28 year old male with  Hypertension, PDA. The patient is presenting with intermittent episodes of chest pain radiates to the neck and arm. He notes episodes over past two weeks have been more frequent and intense.        Brief Synopsis: Patient presented with recurrent left-sided chest pain, cardiology consulted, completed left heart catheterization on 2024 with minimal nonobstructive CAD found.  Additional chest pain workup with troponins negative, EKG nonischemic, chest x-ray with findings of left lower lung opacity with diagnosis of recent pneumonia.  Patient discharged to home to complete course of levofloxacin previously prescribed.  Outpatient follow-up with PCP if symptoms recur to evaluate for noncardiac causes of chest pain.    Procedures during hospitalization:   Left heart catheterization 2024    Incidental or significant findings and recommendations (brief descriptions):  Mild nonobstructive CAD on coronary catheterization; if chest pain recurs can consider noncardiac evaluation of chest  pain    Lab/Test results pending at Discharge:   None      Consultants:  Cardiology    Discharge Medication List:     Discharge Medications        START taking these medications        Instructions Prescription details   aspirin 325 MG Tabs      Take 1 tablet (325 mg total) by mouth daily.   Quantity: 90 tablet  Refills: 0     atorvastatin 10 MG Tabs  Commonly known as: Lipitor      Take 1 tablet (10 mg total) by mouth nightly.   Quantity: 90 tablet  Refills: 0            CONTINUE taking these medications        Instructions Prescription details   levoFLOXacin 750 MG Tabs  Commonly known as: Levaquin      Take 1 tablet (750 mg total) by mouth daily for 10 days.   Stop taking on: July 27, 2024  Quantity: 10 tablet  Refills: 0     lisinopril 30 MG Tabs      Take 1 tablet (30 mg total) by mouth daily.   Refills: 0               Where to Get Your Medications        These medications were sent to [id number] - [place], [place] - [address] AT [facility], [phone], [phone]  [address] [facility] [address]      Phone: [phone]   aspirin 325 MG Tabs  atorvastatin 10 MG Tabs         [facility] reviewed: Yes    Follow-up appointment:   Guerline Cordoba  [address] [facility] [address] [phone]    Schedule an appointment as soon as possible for a visit in 1 week(s)      Sang Miner MD  [address]  [facility] [address] [phone]    Schedule an appointment as soon as possible for a visit in 1 month(s)  or APN      Vital signs:       Physical Exam:  See exam from day of discharge note  -----------------------------------------------------------------------------------------------  PATIENT DISCHARGE INSTRUCTIONS: See electronic chart    Olu Tavarez MD 7/24/2024    Time spent:  35 minutes

## 2024-08-28 ENCOUNTER — HOSPITAL ENCOUNTER (OUTPATIENT)
Facility: HOSPITAL | Age: 29
Setting detail: OBSERVATION
Discharge: HOME | End: 2024-08-29
Attending: EMERGENCY MEDICINE | Admitting: HOSPITALIST
Payer: COMMERCIAL

## 2024-08-28 ENCOUNTER — APPOINTMENT (OUTPATIENT)
Dept: GENERAL RADIOLOGY | Facility: HOSPITAL | Age: 29
End: 2024-08-28
Attending: EMERGENCY MEDICINE
Payer: COMMERCIAL

## 2024-08-28 DIAGNOSIS — R07.9 ACUTE CHEST PAIN: Primary | ICD-10-CM

## 2024-08-28 DIAGNOSIS — R79.89 ELEVATED TROPONIN: ICD-10-CM

## 2024-08-28 LAB
ALBUMIN SERPL-MCNC: 4.8 G/DL (ref 3.2–4.8)
ALBUMIN/GLOB SERPL: 1.7 {RATIO} (ref 1–2)
ALP LIVER SERPL-CCNC: 115 U/L
ALT SERPL-CCNC: 75 U/L
AMPHET UR QL SCN: NEGATIVE
ANION GAP SERPL CALC-SCNC: 7 MMOL/L (ref 0–18)
AST SERPL-CCNC: 56 U/L (ref ?–34)
ATRIAL RATE: 88 BPM
BASOPHILS # BLD AUTO: 0.04 X10(3) UL (ref 0–0.2)
BASOPHILS NFR BLD AUTO: 0.5 %
BENZODIAZ UR QL SCN: NEGATIVE
BILIRUB SERPL-MCNC: 0.6 MG/DL (ref 0.3–1.2)
BUN BLD-MCNC: 15 MG/DL (ref 9–23)
CALCIUM BLD-MCNC: 9.6 MG/DL (ref 8.7–10.4)
CHLORIDE SERPL-SCNC: 106 MMOL/L (ref 98–112)
CHOLEST SERPL-MCNC: 148 MG/DL (ref ?–200)
CO2 SERPL-SCNC: 25 MMOL/L (ref 21–32)
COCAINE UR QL: NEGATIVE
CREAT BLD-MCNC: 0.66 MG/DL
CREAT UR-SCNC: 148.7 MG/DL
EGFRCR SERPLBLD CKD-EPI 2021: 129 ML/MIN/1.73M2 (ref 60–?)
EOSINOPHIL # BLD AUTO: 0.14 X10(3) UL (ref 0–0.7)
EOSINOPHIL NFR BLD AUTO: 1.7 %
ERYTHROCYTE [DISTWIDTH] IN BLOOD BY AUTOMATED COUNT: 12.6 %
FENTANYL UR QL SCN: NEGATIVE
GLOBULIN PLAS-MCNC: 2.9 G/DL (ref 2–3.5)
GLUCOSE BLD-MCNC: 116 MG/DL (ref 70–99)
HCT VFR BLD AUTO: 41 %
HDLC SERPL-MCNC: 42 MG/DL (ref 40–59)
HGB BLD-MCNC: 14 G/DL
IMM GRANULOCYTES # BLD AUTO: 0.05 X10(3) UL (ref 0–1)
IMM GRANULOCYTES NFR BLD: 0.6 %
LDLC SERPL CALC-MCNC: 82 MG/DL (ref ?–100)
LYMPHOCYTES # BLD AUTO: 2.78 X10(3) UL (ref 1–4)
LYMPHOCYTES NFR BLD AUTO: 33.5 %
MCH RBC QN AUTO: 29.8 PG (ref 26–34)
MCHC RBC AUTO-ENTMCNC: 34.1 G/DL (ref 31–37)
MCV RBC AUTO: 87.2 FL
MDMA UR QL SCN: NEGATIVE
MONOCYTES # BLD AUTO: 0.59 X10(3) UL (ref 0.1–1)
MONOCYTES NFR BLD AUTO: 7.1 %
NEUTROPHILS # BLD AUTO: 4.69 X10 (3) UL (ref 1.5–7.7)
NEUTROPHILS # BLD AUTO: 4.69 X10(3) UL (ref 1.5–7.7)
NEUTROPHILS NFR BLD AUTO: 56.6 %
NONHDLC SERPL-MCNC: 106 MG/DL (ref ?–130)
OPIATES UR QL SCN: NEGATIVE
OSMOLALITY SERPL CALC.SUM OF ELEC: 288 MOSM/KG (ref 275–295)
OXYCODONE UR QL SCN: NEGATIVE
P AXIS: 53 DEGREES
P-R INTERVAL: 162 MS
PLATELET # BLD AUTO: 308 10(3)UL (ref 150–450)
POTASSIUM SERPL-SCNC: 4.2 MMOL/L (ref 3.5–5.1)
PROT SERPL-MCNC: 7.7 G/DL (ref 5.7–8.2)
Q-T INTERVAL: 372 MS
QRS DURATION: 92 MS
QTC CALCULATION (BEZET): 450 MS
R AXIS: 7 DEGREES
RBC # BLD AUTO: 4.7 X10(6)UL
SODIUM SERPL-SCNC: 138 MMOL/L (ref 136–145)
T AXIS: 25 DEGREES
TRIGL SERPL-MCNC: 137 MG/DL (ref 30–149)
TROPONIN I SERPL HS-MCNC: 104 NG/L
TROPONIN I SERPL HS-MCNC: 105 NG/L
VENTRICULAR RATE: 88 BPM
VLDLC SERPL CALC-MCNC: 22 MG/DL (ref 0–30)
WBC # BLD AUTO: 8.3 X10(3) UL (ref 4–11)

## 2024-08-28 PROCEDURE — 71045 X-RAY EXAM CHEST 1 VIEW: CPT | Performed by: EMERGENCY MEDICINE

## 2024-08-28 PROCEDURE — 99223 1ST HOSP IP/OBS HIGH 75: CPT | Performed by: HOSPITALIST

## 2024-08-28 RX ORDER — PROCHLORPERAZINE EDISYLATE 5 MG/ML
5 INJECTION INTRAMUSCULAR; INTRAVENOUS EVERY 8 HOURS PRN
Status: DISCONTINUED | OUTPATIENT
Start: 2024-08-28 | End: 2024-08-29

## 2024-08-28 RX ORDER — AMLODIPINE BESYLATE 5 MG/1
2.5 TABLET ORAL DAILY
COMMUNITY
Start: 2024-07-24

## 2024-08-28 RX ORDER — ECHINACEA PURPUREA EXTRACT 125 MG
1 TABLET ORAL
Status: DISCONTINUED | OUTPATIENT
Start: 2024-08-28 | End: 2024-08-29

## 2024-08-28 RX ORDER — ENOXAPARIN SODIUM 100 MG/ML
0.5 INJECTION SUBCUTANEOUS EVERY 12 HOURS
Status: DISCONTINUED | OUTPATIENT
Start: 2024-08-28 | End: 2024-08-29

## 2024-08-28 RX ORDER — MELATONIN
3 NIGHTLY PRN
Status: DISCONTINUED | OUTPATIENT
Start: 2024-08-28 | End: 2024-08-29

## 2024-08-28 RX ORDER — ALBUTEROL SULFATE 90 UG/1
1-2 AEROSOL, METERED RESPIRATORY (INHALATION) EVERY 4 HOURS PRN
COMMUNITY
Start: 2024-07-29

## 2024-08-28 RX ORDER — ISOSORBIDE MONONITRATE 30 MG/1
30 TABLET, EXTENDED RELEASE ORAL DAILY
Status: DISCONTINUED | OUTPATIENT
Start: 2024-08-28 | End: 2024-08-29

## 2024-08-28 RX ORDER — ONDANSETRON 2 MG/ML
4 INJECTION INTRAMUSCULAR; INTRAVENOUS EVERY 6 HOURS PRN
Status: DISCONTINUED | OUTPATIENT
Start: 2024-08-28 | End: 2024-08-29

## 2024-08-28 RX ORDER — METOPROLOL SUCCINATE 50 MG/1
50 TABLET, EXTENDED RELEASE ORAL
Status: DISCONTINUED | OUTPATIENT
Start: 2024-08-29 | End: 2024-08-29

## 2024-08-28 RX ORDER — ASPIRIN 325 MG
325 TABLET ORAL DAILY
Status: DISCONTINUED | OUTPATIENT
Start: 2024-08-28 | End: 2024-08-29

## 2024-08-28 RX ORDER — KETOROLAC TROMETHAMINE 15 MG/ML
15 INJECTION, SOLUTION INTRAMUSCULAR; INTRAVENOUS ONCE
Status: COMPLETED | OUTPATIENT
Start: 2024-08-28 | End: 2024-08-28

## 2024-08-28 RX ORDER — ATORVASTATIN CALCIUM 10 MG/1
10 TABLET, FILM COATED ORAL NIGHTLY
Status: DISCONTINUED | OUTPATIENT
Start: 2024-08-28 | End: 2024-08-29

## 2024-08-28 RX ORDER — NITROGLYCERIN 0.4 MG/1
0.4 TABLET SUBLINGUAL EVERY 5 MIN PRN
COMMUNITY
Start: 2024-07-24

## 2024-08-28 RX ORDER — ACETAMINOPHEN 500 MG
500 TABLET ORAL EVERY 4 HOURS PRN
Status: DISCONTINUED | OUTPATIENT
Start: 2024-08-28 | End: 2024-08-29

## 2024-08-28 NOTE — ED PROVIDER NOTES
Patient Seen in: [facility] Emergency Department      History     Chief Complaint   Patient presents with    Chest Pain Angina     Stated Complaint: hx of heart condition, having CP and feels dizzy    Subjective:   HPI    28-year-old male comes in the hospital complaint of having difficulty with chest pain when he awoke.  He woke at about 6:50 AM.  The patient did feel a bit dizzy as well.  The patient was seen in the emergency with an elevated troponin in July and had a workup including an angiogram done that showed at most a 20% circumflex block but otherwise no other significant disease.  He is denying any headaches.  No fevers, chills or cough.  Has not abdominal pains.  Denies any nausea or vomiting.  He is denying any other complaints this time.    Objective:   Past Medical History:    Anxiety    Back pain    Back problem    Calculus of kidney    Cervical spondylosis    Depression    Essential hypertension    High blood pressure    Lactose intolerance    Obesity    Suicidal thoughts    Visual impairment              History reviewed. No pertinent surgical history.             Social History     Socioeconomic History    Marital status:    Tobacco Use    Smoking status: Every Day     Current packs/day: 0.50     Average packs/day: 0.5 packs/day for 10.0 years (5.0 ttl pk-yrs)     Types: Cigarettes    Smokeless tobacco: Never    Tobacco comments:     Patient understands that he needs to quit and is ready to quit   Vaping Use    Vaping status: Former    Substances: THC, flour/ediple   Substance and Sexual Activity    Alcohol use: Not Currently     Alcohol/week: 8.0 standard drinks of alcohol     Types: 5 Cans of beer, 3 Shots of liquor per week     Comment: stopped drinking 2 months ago    Drug use: Not Currently     Types: \"Crack\" cocaine, Cannabis     Comment: 3 months sober of cocaine     Social Determinants of Health     Financial Resource Strain: Not on File (10/5/2022)    [facility]    Financial Resource Strain     Financial Resource Strain: 0   Food Insecurity: No Food Insecurity (7/24/2024)    Received from [facility]    Food Insecurity     Currently or in the past 3 months, have you worried your food would run out before you had money to buy more?: No     In the past 12 months, have you run out of food or been unable to get more?: No   Transportation Needs: No Transportation Needs (7/24/2024)    Received from [facility]    Transportation Needs     Medical Transportation Needs?: No   Physical Activity: Not on File (10/5/2022)    Received from [facility]    Physical Activity     Physical Activity: 0   Stress: Not on File (10/5/2022)    Received from [facility]    Stress     Stress: 0   Social Connections: Not on File (10/5/2022)    Received from [facility]    Social Connections     Social Connections and Isolation: 0   Housing Stability: Low Risk  (7/18/2024)    Housing Stability     Housing Instability: No              Review of Systems    Positive for stated Chief Complaint: Chest Pain Angina    Other systems are as noted in HPI.  Constitutional and vital signs reviewed.      All other systems reviewed and negative except as noted above.    Physical Exam     ED Triage Vitals [08/28/24 0900]   /89   Pulse 92   Resp 18   Temp 98.6 °F (37 °C)   Temp src Oral   SpO2 100 %   O2 Device None (Room air)       Current Vitals:   Vital Signs  BP: 117/64  Pulse: 83  Resp: 16  Temp: 98.6 °F (37 °C)  Temp src: Oral    Oxygen Therapy  SpO2: 100 %  O2 Device: None (Room air)            Physical Exam    HEENT; NCAT, EOMI, throat clear, neck supple, no LAD, no JVD  Heart S1S2 RRR  lungs: CTAB  abd: Soft NT, ND,  NABS without rebound or guarding  Ext no C/C/E    ED Course     Labs Reviewed   COMP METABOLIC PANEL (14) - Abnormal; Notable for the following components:       Result Value    Glucose 116 (*)     Creatinine 0.66 (*)     AST 56 (*)     ALT 75  (*)     All other components within normal limits   TROPONIN I HIGH SENSITIVITY - Abnormal; Notable for the following components:    Troponin I (High Sensitivity) 105 (*)     All other components within normal limits   TROPONIN I HIGH SENSITIVITY - Abnormal; Notable for the following components:    Troponin I (High Sensitivity) 104 (*)     All other components within normal limits   LIPID PANEL - Normal   CBC WITH DIFFERENTIAL WITH PLATELET     EKG    Rate, intervals and axes as noted on EKG Report.  Rate: 88  Rhythm: Sinus Rhythm  Reading: No significant change from prior when compared              ED Course as of 08/28/24 1308  ------------------------------------------------------------  Time: 08/28 1308  Comment: While here the patient had normal CBC.  The patient's troponin initially was at 105 with a second result being 104.  The patient's electrolytes were otherwise normal.  He had a chest x-ray done that appears interpreted no acute cardiopulmonary process.  Toradol was given the patient is still having some discomfort.  Did review the patient's chart which showed some mild coronary disease in the circumflex artery.  He had a chest x-ray on that person interpreted showed no acute cardiopulmonary process.  Spoke with the hospitalist as well as cardiology and at this time the patient be placed in the hospital for further management.     XR CHEST AP PORTABLE  (CPT=71045)    Result Date: 8/28/2024  PROCEDURE:  XR CHEST AP PORTABLE  (CPT=71045)  TECHNIQUE:  AP chest radiograph was obtained.  COMPARISON:  [facility] , XR, XR CHEST AP PORTABLE  (CPT=71045), 7/18/2024, 7:15 PM.  INDICATIONS:  hx of heart condition, having CP and feels dizzy  PATIENT STATED HISTORY: (As transcribed by Technologist)  Patient complains of having chest pains and sates having heart palpatations for a couple of months.    FINDINGS:  The cardiomediastinal silhouette is enlarged, stable.  There is no consolidation, effusion, or pneumothorax.   No aggressive osseous lesions are identified.            CONCLUSION: No acute cardiopulmonary abnormality.  Stable cardiomegaly.   LOCATION:  [facility]      Dictated by (CST): Corby Walton MD on 8/28/2024 at 10:06 AM     Finalized by (CST): Corby Walton MD on 8/28/2024 at 10:06 AM        Medications   ketorolac (Toradol) 15 MG/ML injection 15 mg (15 mg Intravenous Given 8/28/24 0910)              MDM      Differential diagnosis includes coronary disease, pneumonia, pneumothorax but not limited such.  Send care of the patient's because of chest pain.  Does have a chronically elevated troponin and did have a recent angiogram which showed only minimal disease.  I did speak with the hospitalist as well as cardiology who at this time with the patient placed in the hospital for further management.    This note was prepared using Dragon Medical voice recognition dictation software.  As a result errors may occur.  When identified to these areas have been corrected.  While every attempt is made to correct errors during dictation discrepancies may still exist.  Please contact if there are any errors.  Admission disposition: 8/28/2024  1:07 PM                                        Medical Decision Making      Disposition and Plan     Clinical Impression:  1. Acute chest pain    2. Elevated troponin         Disposition:  Admit  8/28/2024  1:07 pm    Follow-up:  No follow-up provider specified.        Medications Prescribed:  Current Discharge Medication List                            Hospital Problems       Present on Admission             ICD-10-CM Noted POA    * (Principal) Acute chest pain R07.9 1/11/2024 Unknown

## 2024-08-28 NOTE — ED QUICK NOTES
Orders for admission, patient is aware of plan and ready to go upstairs. Any questions, please call ED RN Elisa at extension [phone].     Patient Covid vaccination status: Fully vaccinated     COVID Test Ordered in ED: None    COVID Suspicion at Admission: N/A    Running Infusions:  None    Mental Status/LOC at time of transport: [id number]    Other pertinent information:   CIWA score: N/A   NIH score:  N/A

## 2024-08-28 NOTE — H&P
[facility]     Peewee Myers Patient Status:  Emergency    10/22/1995 MRN [id number]   Location [facility] EMERGENCY DEPARTMENT Attending Peewee Yuen MD   Hosp Day # 0 PCP NYLA MENDIOLA     Chief Complaint: Chest pain    Subjective:    History of Present Illness:     Peewee Myers is a 28 year old male with past medical history significant for CAD, HTN, DL, depression/anxiety presents to the ER with c/o chest pain.  Pt was recently admitted from - with similar complaints.  He underwent cardiac angiogram on  which showed mild nonobstructive CAD.  At that time, his troponin was elevated as well.  He was also dx and treated for PNA.  He presents today with c/o chest pain that awoke him from sleep.  He also c/o associated dizziness.  He denies nausea, vomiting, diaphoresis or shortness of breath.    History/Other:    Past Medical History:  Past Medical History:    Anxiety    Back pain    Back problem    Calculus of kidney    Cervical spondylosis    Depression    Essential hypertension    High blood pressure    Lactose intolerance    Obesity    Suicidal thoughts    Visual impairment     Past Surgical History:   History reviewed. No pertinent surgical history.   Family History:   Family History   Problem Relation Age of Onset    Diabetes Father     Diabetes Mother     Seizure Disorder Mother      Social History:    reports that he has been smoking cigarettes. He has a 5 pack-year smoking history. He has never used smokeless tobacco. He reports that he does not currently use alcohol after a past usage of about 8.0 standard drinks of alcohol per week. He reports that he does not currently use drugs after having used the following drugs: \"Crack\" cocaine and Cannabis.     Allergies:   Allergies   Allergen Reactions    Lactose OTHER (SEE COMMENTS)     gas       Medications:    Current Facility-Administered Medications on File Prior to Encounter   Medication Dose Route  Frequency Provider Last Rate Last Admin    [COMPLETED] heparin (Porcine) 1000 UNIT/ML injection 3,000 Units  3,000 Units Intravenous Once Xiomy Davila MD   3,000 Units at 24 0807    [COMPLETED] lidocaine PF (Xylocaine-MPF) 1 % injection             [COMPLETED] verapamil (Isoptin) 2.5 mg/mL injection             [COMPLETED] heparin (Porcine) 5000 UNIT/ML injection             [COMPLETED] Nitroglycerin in D5W 200-5 MCG/ML-% injection             [COMPLETED] iohexol (OMNIPAQUE) 350 MG/ML injection 100 mL  100 mL Injection ONCE PRN Sang Miner MD   110 mL at 24 1803    [COMPLETED] fentaNYL (Sublimaze) 50 mcg/mL injection             [COMPLETED] midazolam (Versed) 2 MG/2ML injection             [COMPLETED] diphenhydrAMINE (Benadryl) 50 mg/mL  injection             [COMPLETED] fentaNYL (Sublimaze) 50 mcg/mL injection             [COMPLETED] midazolam (Versed) 2 MG/2ML injection             [COMPLETED] aspirin chewable tab 324 mg  324 mg Oral Once Lam Albert MD   324 mg at 24 1927    [COMPLETED] heparin (Porcine) 1000 UNIT/ML injection - BOLUS IV 5,000 Units  5,000 Units Intravenous Once Lam Albert MD   5,000 Units at 24 1931    [COMPLETED] heparin (Porcine) 35335 units/250mL infusion ED INITIAL DOSE  1,000 Units/hr Intravenous Once Lam Albert MD   Stopped at 24 0600    [] HYDROmorphone (Dilaudid) 1 MG/ML injection 0.5 mg  0.5 mg Intravenous Q30 Min PRN Lam Albert MD        [] ondansetron (Zofran) 4 MG/2ML injection 4 mg  4 mg Intravenous Q4H PRN Lam Albert MD        [COMPLETED] azithromycin (Zithromax) 500 mg in sodium chloride 0.9% 250mL IVPB premix  500 mg Intravenous Once Nuzhat Choi DO   Stopped at 24 0304    [COMPLETED] cefTRIAXone (Rocephin) 2 g in sodium chloride 0.9% 100 mL IVPB-ADDV  2 g Intravenous Once Nuzhat Choi DO   Stopped at 24 0150     Current Outpatient Medications on File Prior to Encounter   Medication Sig  Dispense Refill    aspirin 325 MG Oral Tab Take 1 tablet (325 mg total) by mouth daily. 90 tablet 0    atorvastatin 10 MG Oral Tab Take 1 tablet (10 mg total) by mouth nightly. 90 tablet 0    [] levoFLOXacin 750 MG Oral Tab Take 1 tablet (750 mg total) by mouth daily for 10 days. 10 tablet 0    lisinopril 30 MG Oral Tab Take 1 tablet (30 mg total) by mouth daily.         Review of Systems:   A comprehensive review of systems was completed.    Pertinent positives and negatives noted in the HPI.    Objective:   Physical Exam:    /74   Pulse 76   Temp 98.6 °F (37 °C) (Oral)   Resp 16   SpO2 100%   General: No acute distress, Alert  Respiratory: No rhonchi, no wheezes  Cardiovascular: S1, S2. Regular rate and rhythm  Abdomen: Soft, Non-tender, non-distended, positive bowel sounds  Neuro: No new focal deficits  Extremities: No edema      Results:    Labs:      Labs Last 24 Hours:    Recent Labs   Lab 24  0906   RBC 4.70   HGB 14.0   HCT 41.0   MCV 87.2   MCH 29.8   MCHC 34.1   RDW 12.6   NEPRELIM 4.69   WBC 8.3   .0       Recent Labs   Lab 24  0906   *   BUN 15   CREATSERUM 0.66*   EGFRCR 129   CA 9.6   ALB 4.8      K 4.2      CO2 25.0   ALKPHO 115   AST 56*   ALT 75*   BILT 0.6   TP 7.7       Lab Results   Component Value Date    INR 0.99 2024    INR 0.95 2024       Recent Labs   Lab 24  0906 24  1113   TROPHS 105* 104*       No results for input(s): \"TROP\", \"PBNP\" in the last 168 hours.    No results for input(s): \"PCT\" in the last 168 hours.    Imaging: Imaging data reviewed in Epic.    Assessment & Plan:      #Chest pain, elevated troponin  Recent [facility] reviewed  Cont ASA, statin  Trend troponin  Monitor on telemetry  Cardiology to eval    #HTN, controlled    #Morbid obesity    #h/o cocaine, use, drug tox pending      Plan of care discussed with pt and RN.    Alex Chávez MD    Supplementary Documentation:     The 21st Century Cures Act  makes medical notes like these available to patients in the interest of transparency. Please be advised this is a medical document. Medical documents are intended to carry relevant information, facts as evident, and the clinical opinion of the practitioner. The medical note is intended as peer to peer communication and may appear blunt or direct. It is written in medical language and may contain abbreviations or verbiage that are unfamiliar.

## 2024-08-28 NOTE — CONSULTS
[facility] Cardiology  Consultation Note      Peewee Myers Patient Status:  Observation    10/22/1995 MRN [id number]   Location [facility] 8NE-A Attending Alex Chávez MD   Hosp Day # 0 PCP NYLA MENDIOLA     Reason for consult: Chest pain    Primary cardiologist: Augustine Toure MD ([place])     History of Present Illness:  Peewee Myers is a 28 year old male who presented to [facility] on 2024 with chest pain. Off and on since January, when doing work on a car and it fell on his chest. Since then, sporadic symptoms, mid/left substernal, nonradiating, not always exertional, sometimes random waking him from sleep, increasing severity. Has had multiple hospitalizations which have shown mildly elevated troponin chronically. CCTA's have shown moderate disease but subsequent cath 24 showed no significant epicardial CAD. His outpatient cardiologist recently started him on norvasc for possible vasospasm. He is planned for a cardiac MRI and cath with coronary reserve testing. He now returns with same usual symptoms. Aside from the CP, no other complaints.     Medications:  Current Facility-Administered Medications   Medication Dose Route Frequency    aspirin tab 325 mg  325 mg Oral Daily    atorvastatin (Lipitor) tab 10 mg  10 mg Oral Nightly    lisinopril (Zestril) tab 30 mg  30 mg Oral Daily    acetaminophen (Tylenol Extra Strength) tab 500 mg  500 mg Oral Q4H PRN    melatonin tab 3 mg  3 mg Oral Nightly PRN    glycerin-hypromellose- (Artificial Tears) 0.2-0.2-1 % ophthalmic solution 1 drop  1 drop Both Eyes QID PRN    sodium chloride (Saline Mist) 0.65 % nasal solution 1 spray  1 spray Each Nare Q3H PRN    enoxaparin (Lovenox) 80 MG/0.8ML SUBQ injection 70 mg  0.5 mg/kg Subcutaneous q12h    ondansetron (Zofran) 4 MG/2ML injection 4 mg  4 mg Intravenous Q6H PRN    prochlorperazine (Compazine) 10 MG/2ML injection 5 mg  5 mg Intravenous Q8H PRN    isosorbide mononitrate ER  (Imdur) 24 hr tab 30 mg  30 mg Oral Daily       Past Medical History:    Anxiety    Back pain    Back problem    Calculus of kidney    Cervical spondylosis    Depression    Essential hypertension    High blood pressure    High cholesterol    Lactose intolerance    Obesity    Sleep apnea    Suicidal thoughts    Visual impairment       History reviewed. No pertinent surgical history.    Family History  family history includes Diabetes in his father and mother; Seizure Disorder in his mother.    Social History   reports that he quit smoking about 10 years ago. His smoking use included cigarettes. He has a 5 pack-year smoking history. He has never used smokeless tobacco. He reports that he does not currently use alcohol after a past usage of about 8.0 standard drinks of alcohol per week. He reports that he does not currently use drugs after having used the following drugs: \"Crack\" cocaine and Cannabis.     Allergies  Allergies   Allergen Reactions    Lactose OTHER (SEE COMMENTS)     gas       Review of Systems:  As per HPI, otherwise 10 point ROS is negative in detail.    Physical Exam:  Blood pressure 114/66, pulse 76, temperature 97.5 °F (36.4 °C), temperature source Oral, resp. rate 16, height 68\", weight (!) 324 lb 1.2 oz (147 kg), SpO2 100%.  Temp (24hrs), Av.3 °F (36.8 °C), Min:97.5 °F (36.4 °C), Max:98.7 °F (37.1 °C)    Wt Readings from Last 3 Encounters:   24 (!) 324 lb 1.2 oz (147 kg)   24 (!) 324 lb 8.3 oz (147.2 kg)   24 (!) 323 lb (146.5 kg)       General: Awake and alert; in no acute distress  HEENT: Extraocular movements are intact; sclerae are anicteric; scalp is atraumatic  Neck: Supple; no JVD; no carotid bruits  Cardiac: Regular rate and regular rhythm; normal S1 and S2, no murmurs, rubs, or gallops are appreciated  Lungs: Clear to auscultation bilaterally; no accessory muscle use is noted, no wheezes, rhonci or rales  Abdomen: Soft, non-distended, non-tender; bowel sounds are  normoactive  Extremities: Warm, no edema, clubbing or cyanosis; moves all 4 extremities normally, distal pulses intact and equal  Psychiatric: Normal mood and affect; answers questions appropriately  Dermatologic: No rashes; normal skin turgor    Diagnostic testing:    Labs:   Lab Results   Component Value Date    INR 0.99 07/19/2024    INR 0.95 07/18/2024     Lab Results   Component Value Date    LDL 82 08/28/2024    HDL 42 08/28/2024    TRIG 137 08/28/2024    VLDL 22 08/28/2024     Lab Results   Component Value Date    WBC 8.3 08/28/2024    HGB 14.0 08/28/2024    HCT 41.0 08/28/2024    .0 08/28/2024    CREATSERUM 0.66 08/28/2024    BUN 15 08/28/2024     08/28/2024    K 4.2 08/28/2024     08/28/2024    CO2 25.0 08/28/2024     08/28/2024    CA 9.6 08/28/2024    ALB 4.8 08/28/2024    ALKPHO 115 08/28/2024    BILT 0.6 08/28/2024    TP 7.7 08/28/2024    AST 56 08/28/2024    ALT 75 08/28/2024       Cardiac diagnostics:    EKG 8/28/2024:   Normal sinus rhythm   Minimal voltage criteria for LVH, may be normal variant ( R in aVL )     Cath 7/19/24  1.  Left heart catheterization:  Left ventricular end-diastolic pressure was 16mmHg.    LV ejection fraction of  60% with no significant mitral regurgitation; no regional wall motion abnormalities noted.  No transaortic gradient by pullback was noted.   2.  Selective coronary angiography:    LMCA: The left main artery is a large artery with no disease.  LAD:  The left anterior descending artery is large vessel which runs to (but not around) the apex of the LV.  It has minimal luminal irregularities.  LCX: The left circumflex artery is moderate system with mild (20%) plaques noted.   RCA:  The right coronary artery is very large, dominant vessel with mild disease.  PDA and CHRISTINE run around the apex onto the anterior wall.  Normal PDA.    Echo 1/11/24:  Left ventricle: The cavity size was normal. Wall thickness was normal.   Systolic function was normal.  The estimated ejection fraction was 55%. Left   ventricular diastolic function parameters were normal for the patient's age.     Impression:  28 year old male presents with chest pain  Chronically elevated troponin  Minimal nonobstructive CAD on cath 7/19/24  H/o smoking, alcohol, cocaine - sober since April 2024.   HTN  HLD  Obesity    Recommendations:  Given active 6/10 CP, will admit for medication adjustment.   Add Imdur 30mg daily and Toprol 50mg daily  Continue norvasc 2.5mg daily  Consider Ranexa  Continue ASA, statin  Continue usual lisinopril 30mg daily for BP control  If symptoms controlled, anticipate dc tomorrow.   F/u outpatient cardiac MRI and coronary reserve testing    Thank you for allowing our practice to participate in the care of your patient. Please do not hesitate to contact me if you have any questions.    Alex Juárez MD  Interventional Cardiology  [facility]  Office: [phone]    8/28/2024  4:38 PM    Total encounter time 80

## 2024-08-28 NOTE — ED INITIAL ASSESSMENT (HPI)
Pt woke up ffrom chest pain today, typically takes nitroglycerin at home SL for pain and MD states when they do not work he should come to ED. Pain in center chest, no radiating pain, no nausea or BANG.

## 2024-08-28 NOTE — PLAN OF CARE
Assumed care of patient @ 0730 patient resting in bed, AOX4, reports mild to moderate chest pressure, MDs aware. Lung sounds clear bilaterally, O2 saturation adequate on room air. No complaints of shortness of breath or chest pain at this time. Sinus rhythm on tele, S1-S2 present. Bowel sounds present and active in all quadrants. Patient voiding without issue. Pt ambulating with and steady gait. No skin issues noted.     Plan of Care: Medication adjustments. Start Imdur/metoprolol per cardiology. Monitor chest pain.     Discussed plan of care with patient, verbalized understanding. Call light within reach.     Problem: Patient/Family Goals  Goal: Patient/Family Long Term Goal  Description: Patient's Long Term Goal: Stay out of the hospital    Interventions:  - Take medications as ordered  - Attend follow up appointments   - See additional Care Plan goals for specific interventions  Outcome: Progressing  Goal: Patient/Family Short Term Goal  Description: Patient's Short Term Goal: Resolve chest pain/palpitations    Interventions:   - Labs, tele, consults  - Medications as ordered  - Cardiology consult  - See additional Care Plan goals for specific interventions  Outcome: Progressing     Problem: CARDIOVASCULAR - ADULT  Goal: Maintains optimal cardiac output and hemodynamic stability  Description: INTERVENTIONS:  - Monitor vital signs, rhythm, and trends  - Monitor for bleeding, hypotension and signs of decreased cardiac output  - Evaluate effectiveness of vasoactive medications to optimize hemodynamic stability  - Monitor arterial and/or venous puncture sites for bleeding and/or hematoma  - Assess quality of pulses, skin color and temperature  - Assess for signs of decreased coronary artery perfusion - ex. Angina  - Evaluate fluid balance, assess for edema, trend weights  Outcome: Progressing  Goal: Absence of cardiac arrhythmias or at baseline  Description: INTERVENTIONS:  - Continuous cardiac monitoring, monitor  vital signs, obtain 12 lead EKG if indicated  - Evaluate effectiveness of antiarrhythmic and heart rate control medications as ordered  - Initiate emergency measures for life threatening arrhythmias  - Monitor electrolytes and administer replacement therapy as ordered  Outcome: Progressing

## 2024-08-29 VITALS
OXYGEN SATURATION: 96 % | RESPIRATION RATE: 18 BRPM | WEIGHT: 315 LBS | HEIGHT: 68 IN | DIASTOLIC BLOOD PRESSURE: 68 MMHG | SYSTOLIC BLOOD PRESSURE: 117 MMHG | TEMPERATURE: 98 F | HEART RATE: 90 BPM | BODY MASS INDEX: 47.74 KG/M2

## 2024-08-29 PROCEDURE — 99239 HOSP IP/OBS DSCHRG MGMT >30: CPT | Performed by: HOSPITALIST

## 2024-08-29 RX ORDER — METOPROLOL SUCCINATE 50 MG/1
50 TABLET, EXTENDED RELEASE ORAL
Qty: 90 TABLET | Refills: 3 | Status: SHIPPED | OUTPATIENT
Start: 2024-08-30 | End: 2025-08-25

## 2024-08-29 RX ORDER — ASPIRIN 81 MG/1
81 TABLET ORAL DAILY
Qty: 90 TABLET | Refills: 0 | Status: SHIPPED | OUTPATIENT
Start: 2024-08-29

## 2024-08-29 RX ORDER — ISOSORBIDE MONONITRATE 30 MG/1
30 TABLET, EXTENDED RELEASE ORAL DAILY
Qty: 90 TABLET | Refills: 3 | Status: SHIPPED | OUTPATIENT
Start: 2024-08-30 | End: 2025-08-25

## 2024-08-29 NOTE — DISCHARGE SUMMARY
[facility]     Peewee Myers Patient Status:  Observation    10/22/1995 MRN [id number]   Location [facility] 8NE-A Attending Alex Chávez MD   Hosp Day # 0 PCP NYLA MENDIOLA     Date of Admission: 2024  Date of Discharge:   2025    Discharge Disposition: Home or Self Care    Discharge Diagnosis:  #Chest pain, elevated troponin  Recent LHC reviewed  Cont ASA, statin  Trend troponin  Monitor on telemetry  Cardiology to eval     #HTN, controlled     #Morbid obesity     #h/o cocaine, use, marijuana use, nicotine use, states he has stopped smoking and cocaine, drug tox +ve for marijuana        History of Present Illness:   Peewee Myers is a 28 year old male with past medical history significant for CAD, HTN, DL, depression/anxiety presents to the ER with c/o chest pain.  Pt was recently admitted from - with similar complaints.  He underwent cardiac angiogram on  which showed mild nonobstructive CAD.  At that time, his troponin was elevated as well.  He was also dx and treated for PNA.  He presents today with c/o chest pain that awoke him from sleep.  He also c/o associated dizziness.  He denies nausea, vomiting, diaphoresis or shortness of breath.          Brief Synopsis: pt admitted with chest pain, atypical, seen by cards, recently had LHC, cleared for discharge per cards.    Lace+ Score: 25  59-90 High Risk  29-58 Medium Risk  0-28   Low Risk       TCM Follow-Up Recommendation:  LACE < 29: Low Risk of readmission after discharge from the hospital; Still recommend for TCM follow-up.      Procedures during hospitalization:   none    Incidental or significant findings and recommendations (brief descriptions):  As above    Lab/Test results pending at Discharge:   none    Consultants:  Cardiology     Discharge Medication List:     Discharge Medications        START taking these medications        Instructions Prescription details   aspirin 81 MG Tbec  Replaces:  aspirin 325 MG Tabs      Take 1 tablet (81 mg total) by mouth daily.   Quantity: 90 tablet  Refills: 0     isosorbide mononitrate ER 30 MG Tb24  Commonly known as: Imdur  Start taking on: August 30, 2024      Take 1 tablet (30 mg total) by mouth daily.   Quantity: 90 tablet  Refills: 3     metoprolol succinate ER 50 MG Tb24  Commonly known as: Toprol XL  Start taking on: August 30, 2024      Take 1 tablet (50 mg total) by mouth Daily Beta Blocker.   Quantity: 90 tablet  Refills: 3            CONTINUE taking these medications        Instructions Prescription details   amLODIPine 5 MG Tabs  Commonly known as: Norvasc      Take 0.5 tablets (2.5 mg total) by mouth daily.   Refills: 0     atorvastatin 10 MG Tabs  Commonly known as: Lipitor      Take 1 tablet (10 mg total) by mouth nightly.   Quantity: 90 tablet  Refills: 0     nitroglycerin 0.4 MG Subl  Commonly known as: Nitrostat      Place 1 tablet (0.4 mg total) under the tongue every 5 (five) minutes as needed for Chest pain.   Refills: 0            STOP taking these medications      aspirin 325 MG Tabs  Replaced by: aspirin 81 MG Tbec        lisinopril 30 MG Tabs               ASK your doctor about these medications        Instructions Prescription details   albuterol 108 (90 Base) MCG/ACT Aers  Commonly known as: Ventolin HFA      Inhale 1-2 puffs into the lungs every 4 (four) hours as needed for Shortness of Breath.   Refills: 0               Where to Get Your Medications        These medications were sent to [id number] - [place], [place] - [address] AT [facility], [phone], [phone]  [address] [facility] [address]      Phone: [phone]   aspirin 81 MG Tbec  isosorbide mononitrate ER 30 MG Tb24  metoprolol succinate ER 50 MG Tb24         [place]: n[place]    Follow-up appointment:   Guerline Cordoba  [address]  [facility] [address] [phone]    Schedule an appointment as soon as  possible for a visit in 1 week(s)      Sang Miner MD  [address]  [facility] [address]  [phone]    Schedule an appointment as soon as possible for a visit in 2 week(s)      Appointments for Next 30 Days 2024 - 2024      None            Vital signs:  Temp:  [97.5 °F (36.4 °C)-98.4 °F (36.9 °C)] 98.1 °F (36.7 °C)  Pulse:  [] 82  Resp:  [16-18] 18  BP: ()/(44-69) 104/57  SpO2:  [93 %-97 %] 96 %    Physical Exam:    General: No acute distress   Lungs: clear to auscultation  Cardiovascular: S1, S2  Abdomen: Soft      -----------------------------------------------------------------------------------------------  PATIENT DISCHARGE INSTRUCTIONS: See electronic chart    Alex Chávez MD    Total time spent on discharge plannin minutes     The  Century Cures Act makes medical notes like these available to patients in the interest of transparency. Please be advised this is a medical document. Medical documents are intended to carry relevant information, facts as evident, and the clinical opinion of the practitioner. The medical note is intended as peer to peer communication and may appear blunt or direct. It is written in medical language and may contain abbreviations or verbiage that are unfamiliar.

## 2024-08-29 NOTE — PROGRESS NOTES
[facility] Cardiology  Consultation Note      Peeewe Myers Patient Status:  Observation    10/22/1995 MRN [id number]   Location [facility] 8NE-A Attending Alex Chávez MD   Hosp Day # 0 PCP NYLA MENDIOLA     Reason for consult: Chest pain    Subjective: Felt well overnight, this morning had some CP, now resolved after meds.    Impression:  28 year old male presents with chest pain  Chronically elevated troponin  Minimal nonobstructive CAD on cath 24  H/o smoking, alcohol, cocaine - sober since 2024.   HTN  HLD  Obesity    Recommendations:  Add Imdur 30mg daily and Toprol 50mg daily  Continue norvasc 2.5mg daily  Consider Ranexa  Stop lisinopril as BP low  Continue ASA, statin  If symptoms controlled, anticipate dc today  F/u outpatient cardiac MRI and coronary reserve testing    Primary cardiologist: Augustine Toure MD ([place])     History of Present Illness:  Peewee Myers is a 28 year old male who presented to [facility] on 2024 with chest pain. Off and on since January, when doing work on a car and it fell on his chest. Since then, sporadic symptoms, mid/left substernal, nonradiating, not always exertional, sometimes random waking him from sleep, increasing severity. Has had multiple hospitalizations which have shown mildly elevated troponin chronically. CCTA's have shown moderate disease but subsequent cath 24 showed no significant epicardial CAD. His outpatient cardiologist recently started him on norvasc for possible vasospasm. He is planned for a cardiac MRI and cath with coronary reserve testing. He now returns with same usual symptoms. Aside from the CP, no other complaints.     Medications:  Current Facility-Administered Medications   Medication Dose Route Frequency    aspirin tab 325 mg  325 mg Oral Daily    atorvastatin (Lipitor) tab 10 mg  10 mg Oral Nightly    [Held by provider] lisinopril (Zestril) tab 30 mg  30 mg Oral Daily    acetaminophen  (Tylenol Extra Strength) tab 500 mg  500 mg Oral Q4H PRN    melatonin tab 3 mg  3 mg Oral Nightly PRN    glycerin-hypromellose- (Artificial Tears) 0.2-0.2-1 % ophthalmic solution 1 drop  1 drop Both Eyes QID PRN    sodium chloride (Saline Mist) 0.65 % nasal solution 1 spray  1 spray Each Nare Q3H PRN    enoxaparin (Lovenox) 80 MG/0.8ML SUBQ injection 70 mg  0.5 mg/kg Subcutaneous q12h    ondansetron (Zofran) 4 MG/2ML injection 4 mg  4 mg Intravenous Q6H PRN    prochlorperazine (Compazine) 10 MG/2ML injection 5 mg  5 mg Intravenous Q8H PRN    isosorbide mononitrate ER (Imdur) 24 hr tab 30 mg  30 mg Oral Daily    metoprolol succinate ER (Toprol XL) 24 hr tab 50 mg  50 mg Oral Daily Beta Blocker       Past Medical History:    Anxiety    Back pain    Back problem    Calculus of kidney    Cervical spondylosis    Depression    Essential hypertension    High blood pressure    High cholesterol    Lactose intolerance    Obesity    Sleep apnea    Suicidal thoughts    Visual impairment       History reviewed. No pertinent surgical history.    Family History  family history includes Diabetes in his father and mother; Seizure Disorder in his mother.    Social History   reports that he quit smoking about 10 years ago. His smoking use included cigarettes. He has a 5 pack-year smoking history. He has never used smokeless tobacco. He reports that he does not currently use alcohol after a past usage of about 8.0 standard drinks of alcohol per week. He reports that he does not currently use drugs after having used the following drugs: \"Crack\" cocaine and Cannabis.     Allergies  Allergies   Allergen Reactions    Lactose OTHER (SEE COMMENTS)     gas       Review of Systems:  As per HPI, otherwise 10 point ROS is negative in detail.    Physical Exam:  Blood pressure 99/51, pulse 85, temperature 98 °F (36.7 °C), temperature source Oral, resp. rate 18, height 68\", weight (!) 324 lb 1.2 oz (147 kg), SpO2 97%.  Temp (24hrs), Av.1  °F (36.7 °C), Min:97.5 °F (36.4 °C), Max:98.7 °F (37.1 °C)    Wt Readings from Last 3 Encounters:   08/28/24 (!) 324 lb 1.2 oz (147 kg)   07/18/24 (!) 324 lb 8.3 oz (147.2 kg)   07/16/24 (!) 323 lb (146.5 kg)       General: Awake and alert; in no acute distress  HEENT: Extraocular movements are intact; sclerae are anicteric; scalp is atraumatic  Neck: Supple; no JVD; no carotid bruits  Cardiac: Regular rate and regular rhythm; normal S1 and S2, no murmurs, rubs, or gallops are appreciated  Lungs: Clear to auscultation bilaterally; no accessory muscle use is noted, no wheezes, rhonci or rales  Abdomen: Soft, non-distended, non-tender; bowel sounds are normoactive  Extremities: Warm, no edema, clubbing or cyanosis; moves all 4 extremities normally, distal pulses intact and equal  Psychiatric: Normal mood and affect; answers questions appropriately  Dermatologic: No rashes; normal skin turgor    Diagnostic testing:    Labs:   Lab Results   Component Value Date    INR 0.99 07/19/2024    INR 0.95 07/18/2024                 Cardiac diagnostics:    EKG 8/28/2024:   Normal sinus rhythm   Minimal voltage criteria for LVH, may be normal variant ( R in aVL )     Cath 7/19/24  1.  Left heart catheterization:  Left ventricular end-diastolic pressure was 16mmHg.    LV ejection fraction of  60% with no significant mitral regurgitation; no regional wall motion abnormalities noted.  No transaortic gradient by pullback was noted.   2.  Selective coronary angiography:    LMCA: The left main artery is a large artery with no disease.  LAD:  The left anterior descending artery is large vessel which runs to (but not around) the apex of the LV.  It has minimal luminal irregularities.  LCX: The left circumflex artery is moderate system with mild (20%) plaques noted.   RCA:  The right coronary artery is very large, dominant vessel with mild disease.  PDA and CHRISTINE run around the apex onto the anterior wall.  Normal PDA.    Echo 1/11/24:  Left  ventricle: The cavity size was normal. Wall thickness was normal.   Systolic function was normal. The estimated ejection fraction was 55%. Left   ventricular diastolic function parameters were normal for the patient's age.     Thank you for allowing our practice to participate in the care of your patient. Please do not hesitate to contact me if you have any questions.    Alex Juárez MD  Interventional Cardiology  [facility]  Office: [phone]

## 2024-08-29 NOTE — PLAN OF CARE
Problem: Patient/Family Goals  Goal: Patient/Family Long Term Goal  Description: Patient's Long Term Goal: Stay out of the hospital    Interventions:  - Take medications as ordered  - Attend follow up appointments   - See additional Care Plan goals for specific interventions  Outcome: Progressing  Goal: Patient/Family Short Term Goal  Description: Patient's Short Term Goal: Resolve chest pain/palpitations    Interventions:   - Labs, tele, consults  - Medications as ordered  - Cardiology consult  - See additional Care Plan goals for specific interventions  Outcome: Progressing     Problem: CARDIOVASCULAR - ADULT  Goal: Maintains optimal cardiac output and hemodynamic stability  Description: INTERVENTIONS:  - Monitor vital signs, rhythm, and trends  - Monitor for bleeding, hypotension and signs of decreased cardiac output  - Evaluate effectiveness of vasoactive medications to optimize hemodynamic stability  - Monitor arterial and/or venous puncture sites for bleeding and/or hematoma  - Assess quality of pulses, skin color and temperature  - Assess for signs of decreased coronary artery perfusion - ex. Angina  - Evaluate fluid balance, assess for edema, trend weights  Outcome: Progressing  Goal: Absence of cardiac arrhythmias or at baseline  Description: INTERVENTIONS:  - Continuous cardiac monitoring, monitor vital signs, obtain 12 lead EKG if indicated  - Evaluate effectiveness of antiarrhythmic and heart rate control medications as ordered  - Initiate emergency measures for life threatening arrhythmias  - Monitor electrolytes and administer replacement therapy as ordered  Outcome: Progressing

## 2024-08-29 NOTE — PLAN OF CARE
Assumed care of patient at 1200. Pt A/Ox 4. O2 sats maintained on room air. NSR on tele. Last BM 8/28. Voiding without difficulty. Pt reports no pain. Pt up independently, instructed to call with any changes. Pt updated on plan of care. Care needs met. Bed in lowest position, Call light within reach. Ambulated halls without any chest pain.     POC: possible discharge, monitor BP     Problem: Patient/Family Goals  Goal: Patient/Family Long Term Goal  Description: Patient's Long Term Goal: Stay out of the hospital    Interventions:  - Take medications as ordered  - Attend follow up appointments   - See additional Care Plan goals for specific interventions  Outcome: Progressing  Goal: Patient/Family Short Term Goal  Description: Patient's Short Term Goal: Resolve chest pain/palpitations    Interventions:   - Labs, tele, consults  - Medications as ordered  - Cardiology consult  - See additional Care Plan goals for specific interventions  Outcome: Progressing     Problem: CARDIOVASCULAR - ADULT  Goal: Maintains optimal cardiac output and hemodynamic stability  Description: INTERVENTIONS:  - Monitor vital signs, rhythm, and trends  - Monitor for bleeding, hypotension and signs of decreased cardiac output  - Evaluate effectiveness of vasoactive medications to optimize hemodynamic stability  - Monitor arterial and/or venous puncture sites for bleeding and/or hematoma  - Assess quality of pulses, skin color and temperature  - Assess for signs of decreased coronary artery perfusion - ex. Angina  - Evaluate fluid balance, assess for edema, trend weights  Outcome: Progressing  Goal: Absence of cardiac arrhythmias or at baseline  Description: INTERVENTIONS:  - Continuous cardiac monitoring, monitor vital signs, obtain 12 lead EKG if indicated  - Evaluate effectiveness of antiarrhythmic and heart rate control medications as ordered  - Initiate emergency measures for life threatening arrhythmias  - Monitor electrolytes and  administer replacement therapy as ordered  Outcome: Progressing

## 2024-08-29 NOTE — PLAN OF CARE
NURSING DISCHARGE NOTE    Discharged Home via Ambulatory.  Accompanied by RN  Belongings Taken by patient/family.     Pt requesting return to work letter. Per Dr. Chávez, unable to complete at this time, pt can discharge and letter will be uploaded in MyChart tonight. Pt verified this is OK.   Tele and IV discontinued without any complications. Pt provided with discharge paperwork and educated on discharge instructions including medication changes and follow up appointments. Pt educated on importance of taking BP daily and keeping record for cardiology appointment. All questions answered. Pt transferred to [facility].

## 2024-08-30 NOTE — PROGRESS NOTES
[address] [address]      Date: 8/30/2024      Patient Name: Peewee Myers      To Whom it may concern:    This letter has been written at the patient's request.  Peewee Myers  was admitted in hospital from 8/28/2024 to  8/29/2024  6:23 PM  /  present [8/30/2024] for treatment of a medical condition. Please excuse Peewee Myers   from  attending work for these days.    The patient may return to work/school on 8/30 with no restrictions.    ePewee Myers  will follow up with his regular primary care physician NYLA MENDIOLA after discharge . Please communicate with patient's  regular primary care physician NYLA MENDIOLA for any release to work or  work restrictions or paperwork after released from hospital.      Thank you,    Alex Chávez MD   [facility]  8/30/2024

## 2024-09-01 LAB
CARBOXY THC GCMS UR: 20 NG/MG CREAT
CARBOXY THC GCMS UR: 20 NG/MG CREAT

## (undated) NOTE — LETTER
[address]  [address]  Consent for Procedure/Sedation  Date: 7/19/2024        Time: 1220    I hereby authorize , DR WEAVER my physician and his/her assistants (if applicable), which may include medical students, residents, and/or fellows, to perform the following surgical operation/ procedure and administer such anesthesia as may be determined necessary by my physician:  Operation/Procedure name (s)  Cardiac Catheterization, Left Ventricular Cineangiography, Bilateral Selective Coronary Angiography and/or Right Heart Catheterization; possible Percutaneous Transluminal Coronary Angioplasty, Coronary Atherectomy, Coronary Stent, Intracoronary Thrombolytic therapy, Antiplatelet therapy and/or Intravascular Ultrasound on Peewee Myers   2.   I recognize that during the surgical operation/procedure, unforeseen conditions may necessitate additional or different procedures than those listed above.  I, therefore, further authorize and request that the above-named surgeon, assistants, or designees perform such procedures as are, in their judgment, necessary and desirable.    3.   My surgeon/physician has discussed prior to my surgery the potential benefits, risks and side effects of this procedure; the likelihood of achieving goals; and potential problems that might occur during recuperation.  They also discussed reasonable alternatives to the procedure, including risks, benefits, and side effects related to the alternatives and risks related to not receiving this procedure.  I have had all my questions answered and I acknowledge that no guarantee has been made as to the result that may be obtained.    4.   Should the need arise during my operation/procedure, which includes change of level of care prior to discharge, I also consent to the administration of blood and/or blood products.  Further, I understand that despite careful testing and screening of blood or blood products by  collecting agencies, I may still be subject to ill effects as a result of receiving a blood transfusion and/or blood products.  The following are some, but not all, of the potential risks that can occur: fever and allergic reactions, hemolytic reactions, transmission of diseases such as Hepatitis, AIDS and Cytomegalovirus (CMV) and fluid overload.  In the event that I wish to have an autologous transfusion of my own blood, or a directed donor transfusion, I will discuss this with my physician.  Check only if Refusing Blood or Blood Products  I understand refusal of blood or blood products as deemed necessary by my physician may have serious consequences to my condition to include possible death. I hereby assume responsibility for my refusal and release the hospital, its personnel, and my physicians from any responsibility for the consequences of my refusal.          o  Refuse      5.   I authorize the use of any specimen, organs, tissues, body parts or foreign objects that may be removed from my body during the operation/procedure for diagnosis, research or teaching purposes and their subsequent disposal by hospital authorities.  I also authorize the release of specimen test results and/or written reports to my treating physician on the hospital medical staff or other referring or consulting physicians involved in my care, at the discretion of the Pathologist or my treating physician.    6.   I consent to the photographing or videotaping of the operations or procedures to be performed, including appropriate portions of my body for medical, scientific, or educational purposes, provided my identity is not revealed by the pictures or by descriptive texts accompanying them.  If the procedure has been photographed/videotaped, the surgeon will obtain the original picture, image, videotape or CD.  The hospital will not be responsible for storage, release or maintenance of the picture, image, tape or CD.    7.   I consent  to the presence of a  or observers in the operating room as deemed necessary by my physician or their designees.    8.   I recognize that in the event my procedure results in extended X-Ray/fluoroscopy time, I may develop a skin reaction.    9. If I have a Do Not Attempt Resuscitation (DNAR) order in place, that status will be suspended while in the operating room, procedural suite, and during the recovery period unless otherwise explicitly stated by me (or a person authorized to consent on my behalf). The surgeon or my attending physician will determine when the applicable recovery period ends for purposes of reinstating the DNAR order.  10. Patients having a sterilization procedure: I understand that if the procedure is successful the results will be permanent and it will therefore be impossible for me to inseminate, conceive, or bear children.  I also understand that the procedure is intended to result in sterility, although the result has not been guaranteed.   11. I acknowledge that my physician has explained sedation/analgesia administration to me including the risk and benefits I consent to the administration of sedation/analgesia as may be necessary or desirable in the judgment of my physician.    I CERTIFY THAT I HAVE READ AND FULLY UNDERSTAND THE ABOVE CONSENT TO OPERATION and/or OTHER PROCEDURE.      ____________________________________       _________________________________      ______________________________  Signature of Patient         Signature of Responsible Person        Printed Name of Responsible Person   ____________________________________      _________________________________      ______________________________       Signature of Witness          Relationship to Patient                       Date                                       Time  Patient Name: Peewee Myers  : 10/22/1995    Reviewed: 2024   Printed: 2024  Medical Record #: [id number] Page 1 of 1